# Patient Record
Sex: MALE | Race: WHITE | NOT HISPANIC OR LATINO | Employment: OTHER | URBAN - NONMETROPOLITAN AREA
[De-identification: names, ages, dates, MRNs, and addresses within clinical notes are randomized per-mention and may not be internally consistent; named-entity substitution may affect disease eponyms.]

---

## 2017-01-03 ENCOUNTER — OFFICE VISIT (OUTPATIENT)
Dept: OPHTHALMOLOGY | Facility: CLINIC | Age: 72
End: 2017-01-03

## 2017-01-03 ENCOUNTER — DOCUMENTATION (OUTPATIENT)
Dept: OPHTHALMOLOGY | Facility: CLINIC | Age: 72
End: 2017-01-03

## 2017-01-03 DIAGNOSIS — H34.11 CENTRAL RETINAL ARTERY OCCLUSION, RIGHT: Primary | ICD-10-CM

## 2017-01-03 PROCEDURE — 92004 COMPRE OPH EXAM NEW PT 1/>: CPT | Performed by: OPHTHALMOLOGY

## 2017-01-03 NOTE — PROGRESS NOTES
Subjective   Clifford Abdul is a 71 y.o. male.   Chief Complaint   Patient presents with   • Loss of Vision     HPI     BV OD 5 days ago, lasted sev hours, [place] [facility] tranferred to [place], w/u including CT head, carotid doppler, cardiac echo, blood tests, neg; 2 days ago, vision decr, no change, no pain; no GCA/PMR sxs       Last edited by John Snyder MD on 1/3/2017 11:01 AM.       Review of Systems    Objective   Visual Acuity (Snellen - Linear)      Right Left   Dist sc LP 20/40 +2   Near sc  J16                  Pupils      Light React APD   Right 3 1 +3   Left 3 3            Confrontational Visual Fields     Visual Fields      Left Right   Result Full    Restrictions  Total inferior temporal, superior nasal, inferior nasal deficiencies                  Extraocular Movement      Right Left   Result Full Full              Tonometry (Applanation, 8:36 AM)      Right Left   Pressure 18 22              Main Ophthalmology Exam     External Exam      Right Left    External Normal Normal      Slit Lamp Exam      Right Left    Lids/Lashes Normal Normal    Conjunctiva/Sclera White and quiet White and quiet    Cornea Pterygium 2.5mm nasal Clear    Anterior Chamber Deep and quiet Deep and quiet    Iris Round and reactive Round and reactive    Lens Clear Clear    Vitreous Normal Normal      Fundus Exam      Right Left    Disc Normal Normal    Macula diffuse retina edema, pallor, cherry red spot fovea, NFL infarct along inf temp arcade Normal    Vessels no embolus, Arteriolar narrowing Normal    Periphery Normal Normal                Assessment/Plan   Clifford was seen today for loss of vision.    Diagnoses and all orders for this visit:    Central retinal artery occlusion, right  Comments:  prob embolic, doubt GCA  Orders:  -     C-reactive Protein; Future  -     Sedimentation Rate; Future    discussed above with pt  ESR/CRP  Will call with results, f/u following

## 2017-01-06 ENCOUNTER — OFFICE VISIT (OUTPATIENT)
Dept: FAMILY MEDICINE CLINIC | Facility: CLINIC | Age: 72
End: 2017-01-06

## 2017-01-06 VITALS
BODY MASS INDEX: 23.25 KG/M2 | HEART RATE: 46 BPM | WEIGHT: 157 LBS | SYSTOLIC BLOOD PRESSURE: 180 MMHG | DIASTOLIC BLOOD PRESSURE: 99 MMHG | OXYGEN SATURATION: 98 % | HEIGHT: 69 IN

## 2017-01-06 DIAGNOSIS — N18.30 CHRONIC KIDNEY DISEASE, STAGE III (MODERATE) (HCC): Primary | ICD-10-CM

## 2017-01-06 DIAGNOSIS — I10 ESSENTIAL HYPERTENSION: ICD-10-CM

## 2017-01-06 DIAGNOSIS — H34.11 CENTRAL RETINAL ARTERY OCCLUSION OF RIGHT EYE: ICD-10-CM

## 2017-01-06 PROCEDURE — 99214 OFFICE O/P EST MOD 30 MIN: CPT | Performed by: FAMILY MEDICINE

## 2017-01-06 RX ORDER — ATORVASTATIN CALCIUM 40 MG/1
40 TABLET, FILM COATED ORAL NIGHTLY
Refills: 11 | COMMUNITY
Start: 2016-12-31 | End: 2017-12-26 | Stop reason: SDUPTHER

## 2017-01-06 RX ORDER — AMLODIPINE BESYLATE 5 MG/1
5 TABLET ORAL DAILY
Refills: 11 | COMMUNITY
Start: 2016-12-31 | End: 2017-01-06

## 2017-01-06 RX ORDER — AMLODIPINE BESYLATE 10 MG/1
10 TABLET ORAL DAILY
Qty: 30 TABLET | Refills: 11 | Status: SHIPPED | OUTPATIENT
Start: 2017-01-06 | End: 2017-01-13

## 2017-01-06 RX ORDER — ASPIRIN 81 MG/1
81 TABLET ORAL DAILY
COMMUNITY

## 2017-01-06 NOTE — PROGRESS NOTES
Subjective   Clifford Abdul is a 71 y.o. male.     Hypertension   This is a chronic problem. The current episode started more than 1 year ago. The problem has been waxing and waning since onset. Pertinent negatives include no headaches, orthopnea, palpitations, peripheral edema, PND or shortness of breath.   Loss of Vision   This is a new ([facility]) problem. The current episode started 1 to 4 weeks ago. The problem occurs rarely. The problem has been waxing and waning. Associated symptoms include vomiting. Pertinent negatives include no headaches or nausea.        The following portions of the patient's history were reviewed and updated as appropriate: allergies, current medications, past family history, past medical history, past social history, past surgical history and problem list.    Review of Systems   Eyes: Positive for visual disturbance.   Respiratory: Negative for shortness of breath.    Cardiovascular: Negative for palpitations, orthopnea and PND.   Gastrointestinal: Positive for vomiting. Negative for nausea.   Neurological: Negative for headaches.       Objective   Physical Exam   Constitutional: He is oriented to person, place, and time. He appears well-developed and well-nourished. No distress.   HENT:   Head: Normocephalic and atraumatic.   Eyes: Lids are normal. Pupils are equal, round, and reactive to light. Right eye exhibits no chemosis and no discharge. Left eye exhibits no chemosis and no discharge. Right eye exhibits normal extraocular motion and no nystagmus. Left eye exhibits normal extraocular motion and no nystagmus.   Neck: Normal carotid pulses present. Carotid bruit is not present.   Cardiovascular: Normal rate, regular rhythm and normal heart sounds.  Exam reveals no gallop and no friction rub.    No murmur heard.  Pulmonary/Chest: Effort normal and breath sounds normal. No respiratory distress. He has no wheezes. He has no rales. He exhibits no tenderness.    Neurological: He is alert and oriented to person, place, and time. No cranial nerve deficit or sensory deficit.   Skin: Skin is warm and dry. He is not diaphoretic.   Psychiatric: He has a normal mood and affect. His behavior is normal. Judgment and thought content normal.   Nursing note and vitals reviewed.      Assessment/Plan   Problems Addressed this Visit        Cardiovascular and Mediastinum    Essential hypertension    Relevant Medications    amLODIPine (NORVASC) 5 MG tablet    Central retinal artery occlusion of right eye       Genitourinary    Chronic kidney disease, stage III (moderate) - Primary        Check bp bid and return with results   loss of vision right eye 2 min. recurred and vision returned. Seen Dr Wilmer castillo Dx TIA. Ct and MRI neg in [facility]w returning. Notes for [facility] reviewed. ldl 118 and lipitor 40 started

## 2017-01-06 NOTE — MR AVS SNAPSHOT
Clifford Anrdt Franko   1/6/2017 1:15 PM   Office Visit    Dept Phone:  [phone]   Encounter #:  [id number]    Provider:  Shelton Oden MD   Department:  [facility] FAMILY MEDICINE                Your Full Care Plan              Today's Medication Changes          These changes are accurate as of: 1/6/17  2:11 PM.  If you have any questions, ask your nurse or doctor.               Medication(s)that have changed:     amLODIPine 10 MG tablet   Commonly known as:  NORVASC   Take 1 tablet by mouth Daily.   What changed:    - medication strength  - how much to take   Changed by:  Shelton Oden MD            Where to Get Your Medications      These medications were sent to [facility]/pharmacy #[id number] - [place], [place] - [address] [place] [address] - [phone]  - [phone] [address] [address]     Phone:  [phone]     amLODIPine 10 MG tablet                  Your Updated Medication List          This list is accurate as of: 1/6/17  2:11 PM.  Always use your most recent med list.                amLODIPine 10 MG tablet   Commonly known as:  NORVASC   Take 1 tablet by mouth Daily.       aspirin 81 MG EC tablet       atorvastatin 40 MG tablet   Commonly known as:  LIPITOR       lisinopril 40 MG tablet   Commonly known as:  PRINIVIL,ZESTRIL   Take 1 tablet by mouth Daily.       metoprolol succinate XL 50 MG 24 hr tablet   Commonly known as:  TOPROL-XL   Take 1 tablet by mouth Daily.       PARoxetine 20 MG tablet   Commonly known as:  PAXIL   Take 0.5 tablets by mouth Daily.       traZODone 100 MG tablet   Commonly known as:  DESYREL   Take 0.5-1 tablets by mouth Every Night.               You Were Diagnosed With        Codes Comments    Chronic kidney disease, stage III (moderate)    -  Primary ICD-10-CM: N18.3  ICD-9-CM: 585.3     Essential hypertension     ICD-10-CM: I10  ICD-9-CM: 401.9     Central retinal artery occlusion of right eye     ICD-10-CM:  H34.11  ICD-9-CM: 362.31       Instructions     None    Patient Instructions History      Upcoming Appointments     Visit Type Date Time Department    OFFICE VISIT 2017  1:15 PM [facility] FAM MED MAD 4TH    OFFICE VISIT 2017  2:45 PM [facility] MED MAD 4TH    OFFICE VISIT 2017  8:50 AM [facility] OPHTHALMOLOGY MAD    OFFICE VISIT 2017  8:00 AM [facility] FAM MED [facility] 4TH      MyChart Signup     [facility] allows you to send messages to your doctor, view your test results, renew your prescriptions, schedule appointments, and more. To sign up, go to [email or web address] and click on the Sign Up Now link in the New User? box. Enter your "[employer] Activation Code exactly as it appears below along with the last four digits of your Social Security Number and your Date of Birth () to complete the sign-up process. If you do not sign up before the expiration date, you must request a new code.    "[employer] Activation Code: [email or web address]  Expires: 2017  2:11 PM    If you have questions, you can email [email or web address] or call [phone] to talk to our "[employer] staff. Remember, "[employer] is NOT to be used for urgent needs. For medical emergencies, dial 911.               Other Info from Your Visit           Your Appointments     2017  2:45 PM CST   Office Visit with Shelton Oden MD   [facility] FAMILY MEDICINE (--)    [address]  [address] [facility] [address] [phone]           Arrive 15 minutes prior to appointment.            2017  8:50 AM CST   Office Visit with John Snyder MD   [facility] OPHTHALMOLOGY (--)    [address] [place] [address] Dr  [address] [facility] [address] [phone]           Arrive 15 minutes prior to appointment.            2017  8:00 AM CDT   Office Visit with Shelton dOen MD   [facility] FAMILY MEDICINE (--)    [address]  [address]  "[address]  [facility] [address] [phone]           Arrive 15 minutes prior to appointment.              Allergies     No Known Allergies      Reason for Visit     Hypertension     Follow-up hospital recheck      Vital Signs     Blood Pressure Pulse Height Weight Oxygen Saturation Body Mass Index    180/99 46 69\" (175.3 cm) 157 lb (71.2 kg) 98% 23.18 kg/m2    Smoking Status                   Never Smoker           Problems and Diagnoses Noted     Central retinal artery occlusion of right eye    Chronic kidney disease, stage III (moderate)    High blood pressure      No Longer an Issue     High blood pressure        "

## 2017-01-13 ENCOUNTER — OFFICE VISIT (OUTPATIENT)
Dept: FAMILY MEDICINE CLINIC | Facility: CLINIC | Age: 72
End: 2017-01-13

## 2017-01-13 VITALS
BODY MASS INDEX: 23.11 KG/M2 | WEIGHT: 156 LBS | SYSTOLIC BLOOD PRESSURE: 150 MMHG | DIASTOLIC BLOOD PRESSURE: 88 MMHG | HEIGHT: 69 IN

## 2017-01-13 DIAGNOSIS — I10 ESSENTIAL HYPERTENSION: Primary | ICD-10-CM

## 2017-01-13 PROCEDURE — 99213 OFFICE O/P EST LOW 20 MIN: CPT | Performed by: NURSE PRACTITIONER

## 2017-01-13 RX ORDER — VALSARTAN 160 MG/1
160 TABLET ORAL DAILY
Qty: 30 TABLET | Refills: 11 | Status: SHIPPED | OUTPATIENT
Start: 2017-01-13 | End: 2017-01-30

## 2017-01-13 NOTE — PROGRESS NOTES
"  Chief Complaint   Patient presents with   • Follow-up     1 week jordi on B/P ( Cecille patient )     Subjective   Clifford Abdul is a 71 y.o. male.     History of Present Illness     The following portions of the patient's history were reviewed and updated as appropriate: allergies, current medications, past social history and problem list.    Review of Systems    Objective   Visit Vitals   • /88 (BP Location: Left arm, Patient Position: Sitting, Cuff Size: Adult)   • Ht 69\" (175.3 cm)   • Wt 156 lb (70.8 kg)   • BMI 23.04 kg/m2     Physical Exam    Assessment/Plan   Problem List Items Addressed This Visit     None           New Medications Ordered This Visit   Medications   • valsartan (DIOVAN) 160 MG tablet     Sig: Take 1 tablet by mouth Daily.     Dispense:  30 tablet     Refill:  11          "

## 2017-01-13 NOTE — PROGRESS NOTES
Chief Complaint   Patient presents with   • Follow-up     1 week jordi on B/P ( [facility] patient )     Subjective   Clifford Abdul is a 71 y.o. male.     Hypertension   This is a chronic problem. The current episode started more than 1 month ago. The problem has been gradually worsening since onset. The problem is uncontrolled. Associated symptoms include anxiety and malaise/fatigue. Pertinent negatives include no blurred vision, chest pain, headaches, neck pain, palpitations, peripheral edema or shortness of breath. There are no associated agents to hypertension. Risk factors for coronary artery disease include male gender. Past treatments include ACE inhibitors, angiotensin blockers and calcium channel blockers. The current treatment provides mild improvement. There are no compliance problems.  Hypertensive end-organ damage includes kidney disease. There is no history of angina or PVD.        The following portions of the patient's history were reviewed and updated as appropriate: allergies, current medications, past social history and problem list.    Review of Systems   Constitutional: Positive for malaise/fatigue. Negative for activity change, appetite change, fatigue, fever and unexpected weight change.   HENT: Negative.  Negative for ear pain, postnasal drip, rhinorrhea and sinus pressure.    Eyes: Positive for visual disturbance. Negative for blurred vision and photophobia.        Right eye.  Pt sees Dr. Guillen.   Respiratory: Negative.  Negative for cough, shortness of breath and wheezing.    Cardiovascular: Negative.  Negative for chest pain, palpitations and leg swelling.   Gastrointestinal: Negative.    Endocrine: Negative.    Genitourinary: Negative.  Negative for difficulty urinating and dysuria.   Musculoskeletal: Negative.  Negative for arthralgias and neck pain.   Skin: Negative.    Allergic/Immunologic: Negative.  Negative for environmental allergies and food allergies.   Neurological: Negative.   "Negative for dizziness and headaches.   Hematological: Negative.    Psychiatric/Behavioral: The patient is nervous/anxious.         Pt states his HTN makes him anxious when he thinks about it or checks it too often.       Objective   Visit Vitals   • /88 (BP Location: Left arm, Patient Position: Sitting, Cuff Size: Adult)   • Ht 69\" (175.3 cm)   • Wt 156 lb (70.8 kg)   • BMI 23.04 kg/m2     Physical Exam   Constitutional: He is oriented to person, place, and time. He appears well-developed and well-nourished.   HENT:   Head: Normocephalic.   Cardiovascular: Normal rate, regular rhythm, normal heart sounds and intact distal pulses.  Exam reveals no gallop.    No murmur heard.  Pulmonary/Chest: Effort normal and breath sounds normal. He has no wheezes.   Abdominal: Normal appearance.   Neurological: He is alert and oriented to person, place, and time. GCS eye subscore is 4. GCS verbal subscore is 5. GCS motor subscore is 6.   Skin: Skin is warm and dry.   Psychiatric: He has a normal mood and affect. His speech is normal and behavior is normal.       Assessment/Plan   Problem List Items Addressed This Visit        Cardiovascular and Mediastinum    Essential hypertension - Primary    Relevant Medications    valsartan (DIOVAN) 160 MG tablet           New Medications Ordered This Visit   Medications   • valsartan (DIOVAN) 160 MG tablet     Sig: Take 1 tablet by mouth Daily.     Dispense:  30 tablet     Refill:  11       It's not just what you eat, but when you eat  Eat breakfast, and eat smaller meals throughout the day. A healthy breakfast can jumpstart your metabolism, while eating small, healthy meals (rather than the standard three large meals) keeps your energy up.   Avoid eating at night. Try to eat dinner earlier and fast for 14-16 hours until breakfast the next morning. Studies suggest that eating only when you’re most active and giving your digestive system a long break each day may help to regulate " weight.       Check bp once a day only -or if he feels bad-meds as directed

## 2017-01-13 NOTE — MR AVS SNAPSHOT
Clifford Abdul   1/13/2017 11:15 AM   Office Visit    Dept Phone:  [phone]   Encounter #:  [id number]    Provider:  PATRICIA Mendez   Department:  [facility] FAMILY MEDICINE                Your Full Care Plan              Today's Medication Changes          These changes are accurate as of: 1/13/17 11:42 AM.  If you have any questions, ask your nurse or doctor.               New Medication(s)Ordered:     valsartan 160 MG tablet   Commonly known as:  DIOVAN   Take 1 tablet by mouth Daily.   Started by:  PATRICIA Mendez         Stop taking medication(s)listed here:     amLODIPine 10 MG tablet   Commonly known as:  NORVASC   Stopped by:  PATRICIA Mendez                Where to Get Your Medications      These medications were sent to [facility]/pharmacy #[id number] - [place], [place] - [address] [place] [address] - [phone]  - [phone] [address] [address]     Phone:  [phone]     valsartan 160 MG tablet                  Your Updated Medication List          This list is accurate as of: 1/13/17 11:42 AM.  Always use your most recent med list.                aspirin 81 MG EC tablet       atorvastatin 40 MG tablet   Commonly known as:  LIPITOR       lisinopril 40 MG tablet   Commonly known as:  PRINIVIL,ZESTRIL   Take 1 tablet by mouth Daily.       metoprolol succinate XL 50 MG 24 hr tablet   Commonly known as:  TOPROL-XL   Take 1 tablet by mouth Daily.       PARoxetine 20 MG tablet   Commonly known as:  PAXIL   Take 0.5 tablets by mouth Daily.       traZODone 100 MG tablet   Commonly known as:  DESYREL   Take 0.5-1 tablets by mouth Every Night.       valsartan 160 MG tablet   Commonly known as:  DIOVAN   Take 1 tablet by mouth Daily.               Instructions     None    Patient Instructions History      Upcoming Appointments     Visit Type Date Time Department    OFFICE VISIT 1/13/2017 11:15 AM [facility] MED MAD 4TH    OFFICE VISIT 1/17/2017  8:50 AM  [facility] OPHTHALMOLOGY MAD    OFFICE VISIT 2017  8:30 AM [facility] MED MAD 4TH    OFFICE VISIT 2017  8:00 AM [facility] MED MAD 4TH      MyChart Signup     [facility] [employer] allows you to send messages to your doctor, view your test results, renew your prescriptions, schedule appointments, and more. To sign up, go to [employer] and click on the Sign Up Now link in the New User? box. Enter your [employer] Activation Code exactly as it appears below along with the last four digits of your Social Security Number and your Date of Birth () to complete the sign-up process. If you do not sign up before the expiration date, you must request a new code.    [employer] Activation Code: [email or web address]  Expires: 2017  2:11 PM    If you have questions, you can email [email or web address] or call [phone] to talk to our [employer] staff. Remember, [employer] is NOT to be used for urgent needs. For medical emergencies, dial 911.               Other Info from Your Visit           Your Appointments     2017  8:50 AM CST   Office Visit with John Snyder MD   [facility] OPHTHALMOLOGY (--)    [address] [place] [address] Dr  [address] [place] [address] [phone]           Arrive 15 minutes prior to appointment.            2017  8:30 AM CST   Office Visit with PATRICIA Mendez   [facility] FAMILY MEDICINE (--)    [address]  [address] [place] [address] [phone]           Arrive 15 minutes prior to appointment.            2017  8:00 AM CDT   Office Visit with Shelton Oden MD   [facility] FAMILY MEDICINE (--)    [address]  [address] [place] [address] [phone]           Arrive 15 minutes prior to appointment.              Allergies     No Known Allergies      Reason for Visit     Follow-up 1 week jordi on B/P ( [facility] patient )      Vital Signs   "   Blood Pressure Height Weight Body Mass Index Smoking Status       150/88 (BP Location: Left arm, Patient Position: Sitting, Cuff Size: Adult) 69\" (175.3 cm) 156 lb (70.8 kg) 23.04 kg/m2 Never Smoker         "

## 2017-01-17 ENCOUNTER — OFFICE VISIT (OUTPATIENT)
Dept: OPHTHALMOLOGY | Facility: CLINIC | Age: 72
End: 2017-01-17

## 2017-01-17 DIAGNOSIS — H34.11 CENTRAL RETINAL ARTERY OCCLUSION OF RIGHT EYE: Primary | ICD-10-CM

## 2017-01-17 PROCEDURE — 99212 OFFICE O/P EST SF 10 MIN: CPT | Performed by: OPHTHALMOLOGY

## 2017-01-17 NOTE — PROGRESS NOTES
Subjective   Clifford Abdul is a 71 y.o. male.   Chief Complaint   Patient presents with   • CRAO right eye     HPI     Vision better, ESR/CRP normal       Last edited by John Snyder MD on 1/17/2017  9:01 AM.       Review of Systems    Objective   Visual Acuity (Snellen - Linear)      Right Left   Dist sc CF at 3' 20/40 -1                  Pupils      Light React APD   Right 3 Sluggish +3   Left 3 3             Not recorded               Main Ophthalmology Exam     External Exam      Right Left    External Normal Normal      Slit Lamp Exam      Right Left    Lids/Lashes Normal Normal    Conjunctiva/Sclera White and quiet White and quiet    Cornea Pterygium 2.5mm nasal Clear    Anterior Chamber Deep and quiet Deep and quiet    Iris Round and reactive Round and reactive    Lens Clear Clear    Vitreous Normal Normal      Fundus Exam      Right Left    Disc Normal     Macula Normal     Vessels no embolus, Arteriolar narrowing                 Assessment/Plan   Clifford was seen today for crao right eye.    Diagnoses and all orders for this visit:    Central retinal artery occlusion of right eye        F/u one month  Refraction at that time

## 2017-01-17 NOTE — MR AVS SNAPSHOT
Clifford Abdul   2017 8:50 AM   Office Visit    Dept Phone:  [phone]   Encounter #:  [id number]    Provider:  John Snyder MD   Department:  [facility] OPHTHALMOLOGY                Your Full Care Plan              Your Updated Medication List          This list is accurate as of: 17  9:02 AM.  Always use your most recent med list.                aspirin 81 MG EC tablet       atorvastatin 40 MG tablet   Commonly known as:  LIPITOR       lisinopril 40 MG tablet   Commonly known as:  PRINIVIL,ZESTRIL   Take 1 tablet by mouth Daily.       metoprolol succinate XL 50 MG 24 hr tablet   Commonly known as:  TOPROL-XL   Take 1 tablet by mouth Daily.       PARoxetine 20 MG tablet   Commonly known as:  PAXIL   Take 0.5 tablets by mouth Daily.       traZODone 100 MG tablet   Commonly known as:  DESYREL   Take 0.5-1 tablets by mouth Every Night.       valsartan 160 MG tablet   Commonly known as:  DIOVAN   Take 1 tablet by mouth Daily.               You Were Diagnosed With        Codes Comments    Central retinal artery occlusion of right eye    -  Primary ICD-10-CM: H34.11  ICD-9-CM: 362.31       Instructions     None    Patient Instructions History      Upcoming Appointments     Visit Type Date Time Department    OFFICE VISIT 2017  8:50 AM [facility] OPHTHALMOLOGY MAD    OFFICE VISIT 2017  8:30 AM [facility] MED MAD 4TH    OFFICE VISIT 2017  8:30 AM [facility] OPHTHALMOLOGY MAD    OFFICE VISIT 2017  8:00 AM [facility] MED MAD 4TH      [facility] Signup     [facility] [employer] allows you to send messages to your doctor, view your test results, renew your prescriptions, schedule appointments, and more. To sign up, go to [employer] and click on the Sign Up Now link in the New User? box. Enter your [employer] Activation Code exactly as it appears below along with the last four digits of your Social Security Number and your Date of Birth () to complete  the sign-up process. If you do not sign up before the expiration date, you must request a new code.    [employer] Activation Code: [email or web address]  Expires: 1/20/2017  2:11 PM    If you have questions, you can email [email or web address] or call [phone] to talk to our [employer] staff. Remember, MyChart is NOT to be used for urgent needs. For medical emergencies, dial 911.               Other Info from Your Visit           Your Appointments     Jan 30, 2017  8:30 AM CST   Office Visit with PATRICIA Mendez   [facility] FAMILY MEDICINE (--)    [address]  [address] [place] [address] [phone]           Arrive 15 minutes prior to appointment.            Feb 21, 2017  8:30 AM CST   Office Visit with John Snyder MD   [facility] OPHTHALMOLOGY (--)    [address] [place] [address] Dr  [address] [place] [address] [phone]           Arrive 15 minutes prior to appointment.            Apr 14, 2017  8:00 AM CDT   Office Visit with Shelton Oden MD   [facility] FAMILY MEDICINE (--)    [address]  [address] [place] [address] [phone]           Arrive 15 minutes prior to appointment.              Allergies     No Known Allergies      Reason for Visit     CRAO right eye           Vital Signs     Smoking Status                   Never Smoker           Problems and Diagnoses Noted     Central retinal artery occlusion of right eye

## 2017-01-30 ENCOUNTER — OFFICE VISIT (OUTPATIENT)
Dept: FAMILY MEDICINE CLINIC | Facility: CLINIC | Age: 72
End: 2017-01-30

## 2017-01-30 VITALS
DIASTOLIC BLOOD PRESSURE: 86 MMHG | BODY MASS INDEX: 23.11 KG/M2 | WEIGHT: 156 LBS | SYSTOLIC BLOOD PRESSURE: 152 MMHG | HEIGHT: 69 IN

## 2017-01-30 DIAGNOSIS — I10 ESSENTIAL HYPERTENSION: Primary | ICD-10-CM

## 2017-01-30 PROCEDURE — 99213 OFFICE O/P EST LOW 20 MIN: CPT | Performed by: NURSE PRACTITIONER

## 2017-01-30 RX ORDER — AMLODIPINE BESYLATE 5 MG/1
5 TABLET ORAL DAILY
Qty: 30 TABLET | Refills: 11 | Status: SHIPPED | OUTPATIENT
Start: 2017-01-30 | End: 2017-02-01 | Stop reason: SDUPTHER

## 2017-01-30 NOTE — PROGRESS NOTES
Chief Complaint   Patient presents with   • Hypertension     Subjective   Clifford Abdul is a 71 y.o. male.     Hypertension   This is a chronic problem. The current episode started more than 1 month ago. The problem has been gradually worsening since onset. The problem is uncontrolled. Associated symptoms include anxiety and malaise/fatigue. Pertinent negatives include no blurred vision, chest pain, headaches, neck pain, palpitations, peripheral edema or shortness of breath. There are no associated agents to hypertension. Risk factors for coronary artery disease include male gender. Past treatments include ACE inhibitors, angiotensin blockers and calcium channel blockers. The current treatment provides mild improvement. There are no compliance problems.  Hypertensive end-organ damage includes kidney disease. There is no history of angina or PVD.        The following portions of the patient's history were reviewed and updated as appropriate: allergies, current medications, past social history and problem list.    Review of Systems   Constitutional: Positive for malaise/fatigue. Negative for activity change, appetite change, fatigue, fever and unexpected weight change.   HENT: Negative.  Negative for ear pain, postnasal drip, rhinorrhea and sinus pressure.    Eyes: Positive for visual disturbance. Negative for blurred vision and photophobia.        Right eye.  Pt sees Dr. Guillen.   Respiratory: Negative.  Negative for cough, shortness of breath and wheezing.    Cardiovascular: Negative.  Negative for chest pain, palpitations and leg swelling.   Gastrointestinal: Negative.    Endocrine: Negative.    Genitourinary: Negative.  Negative for difficulty urinating and dysuria.   Musculoskeletal: Negative.  Negative for arthralgias and neck pain.   Skin: Negative.    Allergic/Immunologic: Negative.  Negative for environmental allergies and food allergies.   Neurological: Negative.  Negative for dizziness and headaches.  "  Hematological: Negative.    Psychiatric/Behavioral: The patient is nervous/anxious.         Pt states his HTN makes him anxious when he thinks about it or checks it too often.       Objective   Visit Vitals   • /86   • Ht 69\" (175.3 cm)   • Wt 156 lb (70.8 kg)   • BMI 23.04 kg/m2     Physical Exam   Constitutional: He is oriented to person, place, and time. He appears well-developed and well-nourished.   HENT:   Head: Normocephalic and atraumatic.   Eyes: EOM are normal. Pupils are equal, round, and reactive to light.   Neck: Neck supple.   Cardiovascular: Normal rate, regular rhythm, normal heart sounds and intact distal pulses.  Exam reveals no gallop.    No murmur heard.  Pulmonary/Chest: Effort normal and breath sounds normal. He has no wheezes.   Abdominal: Soft. Normal appearance and bowel sounds are normal.   Neurological: He is alert and oriented to person, place, and time. GCS eye subscore is 4. GCS verbal subscore is 5. GCS motor subscore is 6.   Skin: Skin is warm and dry.   Psychiatric: He has a normal mood and affect. His speech is normal and behavior is normal.   Nursing note and vitals reviewed.      Assessment/Plan   Problem List Items Addressed This Visit        Cardiovascular and Mediastinum    Essential hypertension - Primary         No orders of the defined types were placed in this encounter.     continue meds as directed recheck if worsen   "

## 2017-02-01 RX ORDER — AMLODIPINE BESYLATE 5 MG/1
5 TABLET ORAL DAILY
Qty: 30 TABLET | Refills: 11 | Status: SHIPPED | OUTPATIENT
Start: 2017-02-01 | End: 2017-03-06 | Stop reason: SDUPTHER

## 2017-02-01 NOTE — TELEPHONE ENCOUNTER
Spoke with patient and instructed him to stop the diovan and start the norvasc 5 mg daily . Rx sent to [facility] for the norvasc 5 mg .

## 2017-02-21 ENCOUNTER — OFFICE VISIT (OUTPATIENT)
Dept: OPHTHALMOLOGY | Facility: CLINIC | Age: 72
End: 2017-02-21

## 2017-02-21 DIAGNOSIS — H52.03 HYPERMETROPIA, BILATERAL: ICD-10-CM

## 2017-02-21 DIAGNOSIS — H34.11 CENTRAL RETINAL ARTERY OCCLUSION OF RIGHT EYE: Primary | ICD-10-CM

## 2017-02-21 PROBLEM — H52.00 HYPERMETROPIA: Status: ACTIVE | Noted: 2017-02-21

## 2017-02-21 PROCEDURE — 99213 OFFICE O/P EST LOW 20 MIN: CPT | Performed by: OPHTHALMOLOGY

## 2017-02-21 NOTE — PROGRESS NOTES
Subjective   Clifford Abdul is a 71 y.o. male.   Chief Complaint   Patient presents with   • Central retinal artery occlusion right eye         Review of Systems    Objective   Visual Acuity (Snellen - Linear)      Right Left   Dist sc CF at 3' 20/40 -1            Manifest Refraction      Sphere Cylinder Dist Add   Right +1.00 Sphere 20/400 +2.50   Left +1.00 Sphere 20/20 +2.50            Final Rx      Sphere Cylinder Add   Right +1.00 Sphere +2.50   Left +1.00 Sphere +2.50            Pupils      Light React APD   Right 3 1 +3   Left 3 3             Not recorded               Main Ophthalmology Exam     External Exam      Right Left    External Normal Normal      Slit Lamp Exam      Right Left    Lids/Lashes Normal Normal    Conjunctiva/Sclera White and quiet White and quiet    Cornea Pterygium 2.5mm nasal Clear    Anterior Chamber Deep and quiet Deep and quiet    Iris Round and reactive Round and reactive    Lens Clear Clear    Vitreous Normal Normal      Fundus Exam      Right Left    Disc 2+ Pallor Normal    Macula Normal Normal    Vessels no embolus, Arteriolar narrowing Normal                Assessment/Plan   Diagnoses and all orders for this visit:    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

## 2017-03-06 RX ORDER — AMLODIPINE BESYLATE 5 MG/1
5 TABLET ORAL DAILY
Qty: 90 TABLET | Refills: 4 | Status: SHIPPED | OUTPATIENT
Start: 2017-03-06 | End: 2017-08-02 | Stop reason: SDUPTHER

## 2017-04-14 ENCOUNTER — LAB (OUTPATIENT)
Dept: LAB | Facility: HOSPITAL | Age: 72
End: 2017-04-14

## 2017-04-14 ENCOUNTER — TELEPHONE (OUTPATIENT)
Dept: FAMILY MEDICINE CLINIC | Facility: CLINIC | Age: 72
End: 2017-04-14

## 2017-04-14 ENCOUNTER — OFFICE VISIT (OUTPATIENT)
Dept: FAMILY MEDICINE CLINIC | Facility: CLINIC | Age: 72
End: 2017-04-14

## 2017-04-14 VITALS
BODY MASS INDEX: 22.89 KG/M2 | SYSTOLIC BLOOD PRESSURE: 134 MMHG | HEART RATE: 48 BPM | DIASTOLIC BLOOD PRESSURE: 88 MMHG | OXYGEN SATURATION: 98 % | WEIGHT: 155 LBS

## 2017-04-14 DIAGNOSIS — R45.4 IRRITABILITY: ICD-10-CM

## 2017-04-14 DIAGNOSIS — Z12.5 ENCOUNTER FOR PROSTATE CANCER SCREENING: ICD-10-CM

## 2017-04-14 DIAGNOSIS — I10 ESSENTIAL HYPERTENSION: Primary | ICD-10-CM

## 2017-04-14 DIAGNOSIS — E78.00 PURE HYPERCHOLESTEROLEMIA: ICD-10-CM

## 2017-04-14 DIAGNOSIS — N18.30 CHRONIC KIDNEY DISEASE, STAGE III (MODERATE) (HCC): ICD-10-CM

## 2017-04-14 LAB
ALBUMIN SERPL-MCNC: 4.3 G/DL (ref 3.4–4.8)
ALBUMIN/GLOB SERPL: 1.5 G/DL (ref 1.1–1.8)
ALP SERPL-CCNC: 119 U/L (ref 38–126)
ALT SERPL W P-5'-P-CCNC: 55 U/L (ref 21–72)
ANION GAP SERPL CALCULATED.3IONS-SCNC: 12 MMOL/L (ref 5–15)
ARTICHOKE IGE QN: 35 MG/DL (ref 1–129)
AST SERPL-CCNC: 55 U/L (ref 17–59)
BILIRUB SERPL-MCNC: 0.9 MG/DL (ref 0.2–1.3)
BUN BLD-MCNC: 16 MG/DL (ref 7–21)
BUN/CREAT SERPL: 11.7 (ref 7–25)
CALCIUM SPEC-SCNC: 9.7 MG/DL (ref 8.4–10.2)
CHLORIDE SERPL-SCNC: 98 MMOL/L (ref 95–110)
CHOLEST SERPL-MCNC: 122 MG/DL (ref 0–199)
CO2 SERPL-SCNC: 28 MMOL/L (ref 22–31)
CREAT BLD-MCNC: 1.37 MG/DL (ref 0.7–1.3)
GFR SERPL CREATININE-BSD FRML MDRD: 52 ML/MIN/1.73 (ref 49–113)
GLOBULIN UR ELPH-MCNC: 2.8 GM/DL (ref 2.3–3.5)
GLUCOSE BLD-MCNC: 98 MG/DL (ref 60–100)
HDLC SERPL-MCNC: 61 MG/DL (ref 60–200)
LDLC/HDLC SERPL: 0.83 {RATIO} (ref 0–3.55)
POTASSIUM BLD-SCNC: 4.6 MMOL/L (ref 3.5–5.1)
PROT SERPL-MCNC: 7.1 G/DL (ref 6.3–8.6)
PSA SERPL-MCNC: 2.13 NG/ML (ref 0–4)
SODIUM BLD-SCNC: 138 MMOL/L (ref 137–145)
TRIGL SERPL-MCNC: 53 MG/DL (ref 20–199)

## 2017-04-14 PROCEDURE — 80053 COMPREHEN METABOLIC PANEL: CPT | Performed by: FAMILY MEDICINE

## 2017-04-14 PROCEDURE — 99214 OFFICE O/P EST MOD 30 MIN: CPT | Performed by: FAMILY MEDICINE

## 2017-04-14 PROCEDURE — 80061 LIPID PANEL: CPT | Performed by: FAMILY MEDICINE

## 2017-04-14 PROCEDURE — 36415 COLL VENOUS BLD VENIPUNCTURE: CPT | Performed by: FAMILY MEDICINE

## 2017-04-14 PROCEDURE — G0103 PSA SCREENING: HCPCS

## 2017-04-14 RX ORDER — OMEGA-3 FATTY ACIDS/FISH OIL 300-1000MG
1000 CAPSULE ORAL DAILY
COMMUNITY
End: 2018-04-27

## 2017-04-14 NOTE — PROGRESS NOTES
Chief Complaint   Patient presents with   • Hypertension     Subjective   Clifford Abdul is a 71 y.o. male.     Hypertension   This is a chronic problem. The current episode started more than 1 month ago. The problem has been gradually worsening since onset. The problem is uncontrolled. Associated symptoms include anxiety. Pertinent negatives include no blurred vision, chest pain, headaches, malaise/fatigue, neck pain, orthopnea, peripheral edema, PND or shortness of breath. There are no associated agents to hypertension. Risk factors for coronary artery disease include male gender. Past treatments include ACE inhibitors, angiotensin blockers and calcium channel blockers. The current treatment provides mild improvement. There are no compliance problems.  Hypertensive end-organ damage includes kidney disease. There is no history of angina or PVD.   Chronic Kidney Disease   This is a chronic problem. The current episode started more than 1 year ago. The problem has been unchanged. Pertinent negatives include no arthralgias, chest pain, coughing, fatigue, fever, headaches, myalgias or neck pain.   Hyperlipidemia   This is a chronic problem. The problem is controlled. Pertinent negatives include no chest pain, myalgias or shortness of breath.   Depression   Visit Type: follow-up  Patient is not experiencing: depressed mood, insomnia, irritability, nervousness/anxiety and shortness of breath.         The following portions of the patient's history were reviewed and updated as appropriate: allergies, current medications, past social history and problem list.    Review of Systems   Constitutional: Negative for activity change, appetite change, fatigue, fever, irritability, malaise/fatigue and unexpected weight change.   HENT: Negative.  Negative for ear pain, postnasal drip, rhinorrhea and sinus pressure.    Eyes: Positive for visual disturbance. Negative for blurred vision and photophobia.        Right eye.  Pt sees  Dr. Guileln.   Respiratory: Negative.  Negative for cough, shortness of breath and wheezing.    Cardiovascular: Negative.  Negative for chest pain, orthopnea, leg swelling and PND.   Gastrointestinal: Negative.    Endocrine: Negative.    Genitourinary: Negative.  Negative for difficulty urinating and dysuria.   Musculoskeletal: Negative.  Negative for arthralgias, myalgias and neck pain.   Skin: Negative.    Allergic/Immunologic: Negative.  Negative for environmental allergies and food allergies.   Neurological: Negative.  Negative for dizziness and headaches.   Hematological: Negative.    Psychiatric/Behavioral: The patient is not nervous/anxious and does not have insomnia.        Objective   /88  Pulse (!) 48  Wt 155 lb (70.3 kg)  SpO2 98%  BMI 22.89 kg/m2  Physical Exam   Constitutional: He is oriented to person, place, and time. He appears well-developed and well-nourished.   HENT:   Head: Normocephalic and atraumatic.   Eyes: EOM are normal. Pupils are equal, round, and reactive to light.   Neck: Neck supple.   Cardiovascular: Normal rate, regular rhythm, normal heart sounds and intact distal pulses.  Exam reveals no gallop and no friction rub.    No murmur heard.  Pulmonary/Chest: Effort normal and breath sounds normal. He has no wheezes. He has no rales. He exhibits no tenderness.   Abdominal: Soft. Normal appearance and bowel sounds are normal.   Musculoskeletal: He exhibits no edema or tenderness.       Vascular Status -  His exam exhibits right foot vasculature normal. His exam exhibits no right foot edema. His exam exhibits left foot vasculature normal. His exam exhibits no left foot edema.  Neurological: He is alert and oriented to person, place, and time. GCS eye subscore is 4. GCS verbal subscore is 5. GCS motor subscore is 6.   Skin: Skin is warm and dry.   Psychiatric: He has a normal mood and affect. His speech is normal and behavior is normal. Thought content normal.   Nursing note and  vitals reviewed.      Assessment/Plan   Problem List Items Addressed This Visit        Cardiovascular and Mediastinum    Essential hypertension - Primary    Relevant Orders    Comprehensive Metabolic Panel (Completed)       Genitourinary    Chronic kidney disease, stage III (moderate)      Other Visit Diagnoses     Pure hypercholesterolemia        Relevant Orders    Lipid Panel (Completed)    Irritability        Encounter for prostate cancer screening        Relevant Orders    PSA Screen (Completed)           New Medications Ordered This Visit   Medications   • Omega 3 1000 MG capsule     Sig: Take 1,000 mg by mouth Daily.      continue meds as directed recheck if worsen

## 2017-04-14 NOTE — TELEPHONE ENCOUNTER
----- Message from Shelton Oden MD sent at 4/14/2017 12:49 PM CDT -----  Renal function stable.  Otherwise okay

## 2017-04-21 RX ORDER — LISINOPRIL 40 MG/1
TABLET ORAL
Qty: 90 TABLET | Refills: 3 | Status: SHIPPED | OUTPATIENT
Start: 2017-04-21 | End: 2018-04-27 | Stop reason: SDUPTHER

## 2017-04-21 RX ORDER — METOPROLOL SUCCINATE 50 MG/1
TABLET, EXTENDED RELEASE ORAL
Qty: 90 TABLET | Refills: 3 | Status: SHIPPED | OUTPATIENT
Start: 2017-04-21 | End: 2018-04-27 | Stop reason: SDUPTHER

## 2017-05-22 RX ORDER — TRAZODONE HYDROCHLORIDE 100 MG/1
TABLET ORAL
Qty: 90 TABLET | Refills: 1 | Status: SHIPPED | OUTPATIENT
Start: 2017-05-22 | End: 2018-04-27 | Stop reason: SDUPTHER

## 2017-05-23 ENCOUNTER — OFFICE VISIT (OUTPATIENT)
Dept: OPHTHALMOLOGY | Facility: CLINIC | Age: 72
End: 2017-05-23

## 2017-05-23 DIAGNOSIS — H34.11 CENTRAL RETINAL ARTERY OCCLUSION OF RIGHT EYE: Primary | ICD-10-CM

## 2017-05-23 DIAGNOSIS — H11.001 PTERYGIUM, RIGHT: ICD-10-CM

## 2017-05-23 PROCEDURE — 99213 OFFICE O/P EST LOW 20 MIN: CPT | Performed by: OPHTHALMOLOGY

## 2017-07-24 RX ORDER — PAROXETINE HYDROCHLORIDE 20 MG/1
TABLET, FILM COATED ORAL
Qty: 90 TABLET | Refills: 1 | Status: SHIPPED | OUTPATIENT
Start: 2017-07-24 | End: 2018-08-28 | Stop reason: SDUPTHER

## 2017-08-02 RX ORDER — AMLODIPINE BESYLATE 5 MG/1
5 TABLET ORAL DAILY
Qty: 90 TABLET | Refills: 4 | Status: SHIPPED | OUTPATIENT
Start: 2017-08-02 | End: 2017-12-26 | Stop reason: SDUPTHER

## 2017-10-13 ENCOUNTER — APPOINTMENT (OUTPATIENT)
Dept: LAB | Facility: HOSPITAL | Age: 72
End: 2017-10-13

## 2017-10-13 ENCOUNTER — OFFICE VISIT (OUTPATIENT)
Dept: FAMILY MEDICINE CLINIC | Facility: CLINIC | Age: 72
End: 2017-10-13

## 2017-10-13 ENCOUNTER — TELEPHONE (OUTPATIENT)
Dept: FAMILY MEDICINE CLINIC | Facility: CLINIC | Age: 72
End: 2017-10-13

## 2017-10-13 VITALS
OXYGEN SATURATION: 98 % | BODY MASS INDEX: 22.82 KG/M2 | HEIGHT: 69 IN | WEIGHT: 154.1 LBS | DIASTOLIC BLOOD PRESSURE: 74 MMHG | HEART RATE: 54 BPM | SYSTOLIC BLOOD PRESSURE: 126 MMHG

## 2017-10-13 DIAGNOSIS — I10 ESSENTIAL HYPERTENSION: Primary | ICD-10-CM

## 2017-10-13 DIAGNOSIS — E78.00 PURE HYPERCHOLESTEROLEMIA: ICD-10-CM

## 2017-10-13 DIAGNOSIS — Z23 FLU VACCINE NEED: ICD-10-CM

## 2017-10-13 DIAGNOSIS — N18.30 CHRONIC KIDNEY DISEASE, STAGE III (MODERATE) (HCC): ICD-10-CM

## 2017-10-13 LAB
ALBUMIN SERPL-MCNC: 4.3 G/DL (ref 3.4–4.8)
ALBUMIN/GLOB SERPL: 1.5 G/DL (ref 1.1–1.8)
ALP SERPL-CCNC: 112 U/L (ref 38–126)
ALT SERPL W P-5'-P-CCNC: 42 U/L (ref 21–72)
ANION GAP SERPL CALCULATED.3IONS-SCNC: 11 MMOL/L (ref 5–15)
ARTICHOKE IGE QN: 36 MG/DL (ref 1–129)
AST SERPL-CCNC: 44 U/L (ref 17–59)
BILIRUB SERPL-MCNC: 0.9 MG/DL (ref 0.2–1.3)
BUN BLD-MCNC: 18 MG/DL (ref 7–21)
BUN/CREAT SERPL: 13.2 (ref 7–25)
CALCIUM SPEC-SCNC: 9.9 MG/DL (ref 8.4–10.2)
CHLORIDE SERPL-SCNC: 100 MMOL/L (ref 95–110)
CHOLEST SERPL-MCNC: 124 MG/DL (ref 0–199)
CO2 SERPL-SCNC: 29 MMOL/L (ref 22–31)
CREAT BLD-MCNC: 1.36 MG/DL (ref 0.7–1.3)
GFR SERPL CREATININE-BSD FRML MDRD: 52 ML/MIN/1.73 (ref 42–98)
GLOBULIN UR ELPH-MCNC: 2.9 GM/DL (ref 2.3–3.5)
GLUCOSE BLD-MCNC: 103 MG/DL (ref 60–100)
HDLC SERPL-MCNC: 66 MG/DL (ref 60–200)
LDLC/HDLC SERPL: 0.7 {RATIO} (ref 0–3.55)
POTASSIUM BLD-SCNC: 4.5 MMOL/L (ref 3.5–5.1)
PROT SERPL-MCNC: 7.2 G/DL (ref 6.3–8.6)
SODIUM BLD-SCNC: 140 MMOL/L (ref 137–145)
TRIGL SERPL-MCNC: 59 MG/DL (ref 20–199)

## 2017-10-13 PROCEDURE — 80053 COMPREHEN METABOLIC PANEL: CPT | Performed by: FAMILY MEDICINE

## 2017-10-13 PROCEDURE — G0008 ADMIN INFLUENZA VIRUS VAC: HCPCS | Performed by: FAMILY MEDICINE

## 2017-10-13 PROCEDURE — 80061 LIPID PANEL: CPT | Performed by: FAMILY MEDICINE

## 2017-10-13 PROCEDURE — 90662 IIV NO PRSV INCREASED AG IM: CPT | Performed by: FAMILY MEDICINE

## 2017-10-13 PROCEDURE — 99214 OFFICE O/P EST MOD 30 MIN: CPT | Performed by: FAMILY MEDICINE

## 2017-10-13 PROCEDURE — 36415 COLL VENOUS BLD VENIPUNCTURE: CPT | Performed by: FAMILY MEDICINE

## 2017-10-13 NOTE — PROGRESS NOTES
Pr Dr. Oden, Mr. Abdul has been called with his recent lab results & recommendations.  Continue his current medications and follow-up as planned or sooner if any problems.

## 2017-10-13 NOTE — TELEPHONE ENCOUNTER
Pr Dr. Oden, Mr. Abdul has been called with his recent lab results & recommendations.  Continue his current medications and follow-up as planned or sooner if any problems.      ----- Message from Shelton Oden MD sent at 10/13/2017  3:25 PM CDT -----  Renal function stable

## 2017-10-13 NOTE — PROGRESS NOTES
Chief Complaint   Patient presents with   • Hypertension     Subjective   Clifford Abdul is a 72 y.o. male.     Hypertension   This is a chronic problem. The current episode started more than 1 month ago. The problem has been gradually worsening since onset. The problem is uncontrolled. Pertinent negatives include no anxiety, blurred vision, chest pain, malaise/fatigue, orthopnea, peripheral edema, PND or shortness of breath. There are no associated agents to hypertension. Risk factors for coronary artery disease include male gender. Past treatments include ACE inhibitors, angiotensin blockers and calcium channel blockers. The current treatment provides mild improvement. There are no compliance problems.  Hypertensive end-organ damage includes kidney disease. There is no history of angina or PVD.   Chronic Kidney Disease   This is a chronic problem. The current episode started more than 1 year ago. The problem has been unchanged. Pertinent negatives include no chest pain or myalgias.   Hyperlipidemia   This is a chronic problem. The problem is controlled. Pertinent negatives include no chest pain, myalgias or shortness of breath.   Depression   Visit Type: follow-up  Patient is not experiencing: depressed mood, insomnia, irritability, nervousness/anxiety and shortness of breath.         The following portions of the patient's history were reviewed and updated as appropriate: allergies, current medications, past social history and problem list.    Review of Systems   Constitutional: Negative for irritability, malaise/fatigue and unexpected weight change.   HENT: Positive for rhinorrhea.    Eyes: Negative for blurred vision, photophobia and visual disturbance.        Right eye.  Pt sees Dr. Guillen.   Respiratory: Negative.  Negative for shortness of breath and wheezing.    Cardiovascular: Negative.  Negative for chest pain, orthopnea, leg swelling and PND.   Gastrointestinal: Negative.    Endocrine: Negative.   "  Genitourinary: Negative.    Musculoskeletal: Negative.  Negative for myalgias.   Skin: Negative.    Allergic/Immunologic: Negative.    Neurological: Negative.    Hematological: Negative.    Psychiatric/Behavioral: The patient is not nervous/anxious and does not have insomnia.        Objective   /74  Pulse 54  Ht 69\" (175.3 cm)  Wt 154 lb 1.6 oz (69.9 kg)  SpO2 98%  BMI 22.76 kg/m2  Physical Exam   Constitutional: He is oriented to person, place, and time. He appears well-developed and well-nourished.   HENT:   Head: Normocephalic and atraumatic.   Eyes: EOM are normal. Pupils are equal, round, and reactive to light.   Neck: Neck supple.   Cardiovascular: Normal rate, regular rhythm, normal heart sounds and intact distal pulses.  Exam reveals no gallop and no friction rub.    No murmur heard.  Pulmonary/Chest: Effort normal and breath sounds normal. He has no wheezes. He has no rales. He exhibits no tenderness.   Abdominal: Soft. Normal appearance and bowel sounds are normal.   Musculoskeletal: He exhibits no edema or tenderness.       Vascular Status -  His exam exhibits right foot vasculature normal. His exam exhibits no right foot edema. His exam exhibits left foot vasculature normal. His exam exhibits no left foot edema.  Neurological: He is alert and oriented to person, place, and time. GCS eye subscore is 4. GCS verbal subscore is 5. GCS motor subscore is 6.   Skin: Skin is warm and dry.   Psychiatric: He has a normal mood and affect. His speech is normal and behavior is normal. Thought content normal.   Nursing note and vitals reviewed.      Assessment/Plan   Problem List Items Addressed This Visit        Cardiovascular and Mediastinum    Essential hypertension - Primary    Relevant Orders    Comprehensive Metabolic Panel    Pure hypercholesterolemia    Relevant Orders    Lipid Panel       Genitourinary    Chronic kidney disease, stage III (moderate)           No orders of the defined types were " placed in this encounter.     continue meds as directed recheck if worsen

## 2017-12-26 ENCOUNTER — TELEPHONE (OUTPATIENT)
Dept: FAMILY MEDICINE CLINIC | Facility: CLINIC | Age: 72
End: 2017-12-26

## 2017-12-26 RX ORDER — AMLODIPINE BESYLATE 5 MG/1
5 TABLET ORAL DAILY
Qty: 90 TABLET | Refills: 4 | Status: SHIPPED | OUTPATIENT
Start: 2017-12-26 | End: 2018-10-29

## 2017-12-26 RX ORDER — ATORVASTATIN CALCIUM 40 MG/1
40 TABLET, FILM COATED ORAL NIGHTLY
Qty: 30 TABLET | Refills: 11 | Status: SHIPPED | OUTPATIENT
Start: 2017-12-26 | End: 2019-01-11 | Stop reason: SDUPTHER

## 2018-04-27 ENCOUNTER — APPOINTMENT (OUTPATIENT)
Dept: LAB | Facility: HOSPITAL | Age: 73
End: 2018-04-27

## 2018-04-27 ENCOUNTER — OFFICE VISIT (OUTPATIENT)
Dept: FAMILY MEDICINE CLINIC | Facility: CLINIC | Age: 73
End: 2018-04-27

## 2018-04-27 VITALS
HEIGHT: 69 IN | WEIGHT: 154 LBS | HEART RATE: 45 BPM | DIASTOLIC BLOOD PRESSURE: 78 MMHG | SYSTOLIC BLOOD PRESSURE: 130 MMHG | OXYGEN SATURATION: 98 % | BODY MASS INDEX: 22.81 KG/M2

## 2018-04-27 DIAGNOSIS — I10 ESSENTIAL HYPERTENSION: ICD-10-CM

## 2018-04-27 DIAGNOSIS — E78.00 PURE HYPERCHOLESTEROLEMIA: Primary | ICD-10-CM

## 2018-04-27 DIAGNOSIS — F32.89 OTHER DEPRESSION: ICD-10-CM

## 2018-04-27 DIAGNOSIS — N18.30 CHRONIC KIDNEY DISEASE, STAGE III (MODERATE) (HCC): ICD-10-CM

## 2018-04-27 DIAGNOSIS — H34.11 CENTRAL RETINAL ARTERY OCCLUSION OF RIGHT EYE: ICD-10-CM

## 2018-04-27 PROBLEM — F32.A DEPRESSION: Status: ACTIVE | Noted: 2018-04-27

## 2018-04-27 LAB
ALBUMIN SERPL-MCNC: 4.2 G/DL (ref 3.4–4.8)
ALBUMIN/GLOB SERPL: 1.3 G/DL (ref 1.1–1.8)
ALP SERPL-CCNC: 134 U/L (ref 38–126)
ALT SERPL W P-5'-P-CCNC: 50 U/L (ref 21–72)
ANION GAP SERPL CALCULATED.3IONS-SCNC: 13 MMOL/L (ref 5–15)
ARTICHOKE IGE QN: 30 MG/DL (ref 1–129)
AST SERPL-CCNC: 56 U/L (ref 17–59)
BILIRUB SERPL-MCNC: 0.8 MG/DL (ref 0.2–1.3)
BUN BLD-MCNC: 13 MG/DL (ref 7–21)
BUN/CREAT SERPL: 10.2 (ref 7–25)
CALCIUM SPEC-SCNC: 9.6 MG/DL (ref 8.4–10.2)
CHLORIDE SERPL-SCNC: 101 MMOL/L (ref 95–110)
CHOLEST SERPL-MCNC: 116 MG/DL (ref 0–199)
CO2 SERPL-SCNC: 26 MMOL/L (ref 22–31)
CREAT BLD-MCNC: 1.27 MG/DL (ref 0.7–1.3)
GFR SERPL CREATININE-BSD FRML MDRD: 56 ML/MIN/1.73 (ref 42–98)
GLOBULIN UR ELPH-MCNC: 3.2 GM/DL (ref 2.3–3.5)
GLUCOSE BLD-MCNC: 95 MG/DL (ref 60–100)
HDLC SERPL-MCNC: 62 MG/DL (ref 60–200)
LDLC/HDLC SERPL: 0.65 {RATIO} (ref 0–3.55)
POTASSIUM BLD-SCNC: 4.4 MMOL/L (ref 3.5–5.1)
PROT SERPL-MCNC: 7.4 G/DL (ref 6.3–8.6)
SODIUM BLD-SCNC: 140 MMOL/L (ref 137–145)
TRIGL SERPL-MCNC: 67 MG/DL (ref 20–199)

## 2018-04-27 PROCEDURE — 80061 LIPID PANEL: CPT | Performed by: FAMILY MEDICINE

## 2018-04-27 PROCEDURE — 36415 COLL VENOUS BLD VENIPUNCTURE: CPT | Performed by: FAMILY MEDICINE

## 2018-04-27 PROCEDURE — 99214 OFFICE O/P EST MOD 30 MIN: CPT | Performed by: FAMILY MEDICINE

## 2018-04-27 PROCEDURE — 80053 COMPREHEN METABOLIC PANEL: CPT | Performed by: FAMILY MEDICINE

## 2018-04-27 RX ORDER — LISINOPRIL 40 MG/1
40 TABLET ORAL DAILY
Qty: 90 TABLET | Refills: 3 | Status: SHIPPED | OUTPATIENT
Start: 2018-04-27 | End: 2019-04-24 | Stop reason: SDUPTHER

## 2018-04-27 RX ORDER — TRAZODONE HYDROCHLORIDE 100 MG/1
TABLET ORAL
Qty: 90 TABLET | Refills: 1 | Status: SHIPPED | OUTPATIENT
Start: 2018-04-27 | End: 2019-10-30 | Stop reason: SDUPTHER

## 2018-04-27 RX ORDER — METOPROLOL SUCCINATE 50 MG/1
50 TABLET, EXTENDED RELEASE ORAL DAILY
Qty: 90 TABLET | Refills: 3 | Status: SHIPPED | OUTPATIENT
Start: 2018-04-27 | End: 2019-04-24 | Stop reason: SDUPTHER

## 2018-04-27 NOTE — PROGRESS NOTES
Chief Complaint   Patient presents with   • Hypertension     Subjective   Clifford Abdul is a 72 y.o. male.     Hypertension   This is a chronic problem. The current episode started more than 1 month ago. The problem has been gradually worsening since onset. The problem is uncontrolled. Pertinent negatives include no anxiety, blurred vision, chest pain, malaise/fatigue, orthopnea, peripheral edema, PND or shortness of breath. There are no associated agents to hypertension. Risk factors for coronary artery disease include male gender. Current antihypertension treatment includes ACE inhibitors, angiotensin blockers and calcium channel blockers. The current treatment provides mild improvement. There are no compliance problems.  Hypertensive end-organ damage includes kidney disease. There is no history of angina or PVD.   Chronic Kidney Disease   This is a chronic problem. The current episode started more than 1 year ago. The problem has been unchanged. Pertinent negatives include no chest pain or myalgias.   Hyperlipidemia   This is a chronic problem. The current episode started more than 1 year ago. The problem is controlled. Recent lipid tests were reviewed and are normal. Pertinent negatives include no chest pain, myalgias or shortness of breath.   Depression   Visit Type: follow-up  Patient is not experiencing: depressed mood, insomnia, irritability, nervousness/anxiety and shortness of breath.         The following portions of the patient's history were reviewed and updated as appropriate: allergies, current medications, past social history and problem list.    Review of Systems   Constitutional: Negative for irritability, malaise/fatigue and unexpected weight change.   Eyes: Negative for blurred vision.   Respiratory: Negative.  Negative for shortness of breath and wheezing.    Cardiovascular: Negative.  Negative for chest pain, orthopnea, leg swelling and PND.   Gastrointestinal: Negative.    Endocrine:  "Negative.    Genitourinary: Negative.    Musculoskeletal: Negative.  Negative for myalgias.   Skin: Negative.    Allergic/Immunologic: Negative.    Neurological: Negative.    Hematological: Negative.    Psychiatric/Behavioral: The patient is not nervous/anxious and does not have insomnia.        Objective   /78   Pulse (!) 45   Ht 175.3 cm (69\")   Wt 69.9 kg (154 lb)   SpO2 98%   BMI 22.74 kg/m²   Physical Exam   Constitutional: He is oriented to person, place, and time. He appears well-developed and well-nourished.   HENT:   Head: Normocephalic and atraumatic.   Eyes: EOM are normal. Pupils are equal, round, and reactive to light.   Neck: Neck supple.   Cardiovascular: Normal rate, regular rhythm, normal heart sounds and intact distal pulses.  Exam reveals no gallop and no friction rub.    No murmur heard.  Pulmonary/Chest: Effort normal and breath sounds normal. He has no wheezes. He has no rales. He exhibits no tenderness.   Abdominal: Soft. Normal appearance and bowel sounds are normal.   Musculoskeletal: He exhibits no edema or tenderness.     Vascular Status -  His right foot exhibits abnormal foot vasculature . His right foot exhibits no edema. His left foot exhibits abnormal foot vasculature . His left foot exhibits no edema.  Neurological: He is alert and oriented to person, place, and time. GCS eye subscore is 4. GCS verbal subscore is 5. GCS motor subscore is 6.   Skin: Skin is warm and dry.   Psychiatric: He has a normal mood and affect. His speech is normal and behavior is normal. Thought content normal.   Nursing note and vitals reviewed.      Assessment/Plan   Problem List Items Addressed This Visit        Cardiovascular and Mediastinum    Essential hypertension    Relevant Medications    lisinopril (PRINIVIL,ZESTRIL) 40 MG tablet    metoprolol succinate XL (TOPROL-XL) 50 MG 24 hr tablet    Other Relevant Orders    Comprehensive Metabolic Panel    Central retinal artery occlusion of right " eye    Relevant Orders    Ambulatory Referral for Diabetic Eye Exam-Ophthalmology    Pure hypercholesterolemia - Primary    Relevant Orders    Lipid Panel       Genitourinary    Chronic kidney disease, stage III (moderate)       Other    Depression    Relevant Medications    traZODone (DESYREL) 100 MG tablet      Other Visit Diagnoses    None.          New Medications Ordered This Visit   Medications   • traZODone (DESYREL) 100 MG tablet     Sig: TAKE ONE-HALF TO ONE TABLET BY MOUTH EVERY NIGHT AT BEDTIME FOR SLEEP     Dispense:  90 tablet     Refill:  1   • lisinopril (PRINIVIL,ZESTRIL) 40 MG tablet     Sig: Take 1 tablet by mouth Daily.     Dispense:  90 tablet     Refill:  3   • metoprolol succinate XL (TOPROL-XL) 50 MG 24 hr tablet     Sig: Take 1 tablet by mouth Daily.     Dispense:  90 tablet     Refill:  3      continue meds as directed reche

## 2018-04-30 ENCOUNTER — TELEPHONE (OUTPATIENT)
Dept: FAMILY MEDICINE CLINIC | Facility: CLINIC | Age: 73
End: 2018-04-30

## 2018-04-30 NOTE — TELEPHONE ENCOUNTER
-Pr Dr. Oden, Mr. Abdul has been called with his recent lab results & recommendations.  Continue his current medications and follow-up as planned or sooner if any problems.      ---- Message from Shelton Oden MD sent at 4/30/2018 12:51 PM CDT -----  Ok, call or send card.

## 2018-08-28 RX ORDER — PAROXETINE HYDROCHLORIDE 20 MG/1
TABLET, FILM COATED ORAL
Qty: 45 TABLET | Refills: 2 | Status: SHIPPED | OUTPATIENT
Start: 2018-08-28 | End: 2018-08-29 | Stop reason: SDUPTHER

## 2018-08-29 RX ORDER — PAROXETINE HYDROCHLORIDE 20 MG/1
20 TABLET, FILM COATED ORAL DAILY
Qty: 30 TABLET | Refills: 2 | Status: SHIPPED | OUTPATIENT
Start: 2018-08-29 | End: 2018-10-29 | Stop reason: SDUPTHER

## 2018-10-24 ENCOUNTER — TELEPHONE (OUTPATIENT)
Dept: FAMILY MEDICINE CLINIC | Facility: CLINIC | Age: 73
End: 2018-10-24

## 2018-10-29 ENCOUNTER — LAB (OUTPATIENT)
Dept: LAB | Facility: HOSPITAL | Age: 73
End: 2018-10-29

## 2018-10-29 ENCOUNTER — OFFICE VISIT (OUTPATIENT)
Dept: FAMILY MEDICINE CLINIC | Facility: CLINIC | Age: 73
End: 2018-10-29

## 2018-10-29 ENCOUNTER — TELEPHONE (OUTPATIENT)
Dept: FAMILY MEDICINE CLINIC | Facility: CLINIC | Age: 73
End: 2018-10-29

## 2018-10-29 VITALS
HEART RATE: 47 BPM | BODY MASS INDEX: 22.74 KG/M2 | WEIGHT: 153.5 LBS | DIASTOLIC BLOOD PRESSURE: 98 MMHG | HEIGHT: 69 IN | OXYGEN SATURATION: 99 % | SYSTOLIC BLOOD PRESSURE: 178 MMHG

## 2018-10-29 DIAGNOSIS — Z23 FLU VACCINE NEED: ICD-10-CM

## 2018-10-29 DIAGNOSIS — Z12.5 PROSTATE CANCER SCREENING: ICD-10-CM

## 2018-10-29 DIAGNOSIS — N18.30 CHRONIC KIDNEY DISEASE, STAGE III (MODERATE) (HCC): ICD-10-CM

## 2018-10-29 DIAGNOSIS — F32.89 OTHER DEPRESSION: ICD-10-CM

## 2018-10-29 DIAGNOSIS — I10 ESSENTIAL HYPERTENSION: ICD-10-CM

## 2018-10-29 DIAGNOSIS — E78.00 PURE HYPERCHOLESTEROLEMIA: Primary | ICD-10-CM

## 2018-10-29 DIAGNOSIS — Z00.00 MEDICARE ANNUAL WELLNESS VISIT, INITIAL: ICD-10-CM

## 2018-10-29 DIAGNOSIS — R74.01 ELEVATED AST (SGOT): Primary | ICD-10-CM

## 2018-10-29 LAB
ALBUMIN SERPL-MCNC: 4 G/DL (ref 3.4–4.8)
ALBUMIN/GLOB SERPL: 1.2 G/DL (ref 1.1–1.8)
ALP SERPL-CCNC: 160 U/L (ref 38–126)
ALT SERPL W P-5'-P-CCNC: 41 U/L (ref 21–72)
ANION GAP SERPL CALCULATED.3IONS-SCNC: 6 MMOL/L (ref 5–15)
ARTICHOKE IGE QN: 46 MG/DL (ref 1–129)
AST SERPL-CCNC: 60 U/L (ref 17–59)
BILIRUB SERPL-MCNC: 1.1 MG/DL (ref 0.2–1.3)
BUN BLD-MCNC: 17 MG/DL (ref 7–21)
BUN/CREAT SERPL: 13.7 (ref 7–25)
CALCIUM SPEC-SCNC: 9.7 MG/DL (ref 8.4–10.2)
CHLORIDE SERPL-SCNC: 105 MMOL/L (ref 95–110)
CHOLEST SERPL-MCNC: 123 MG/DL (ref 0–199)
CO2 SERPL-SCNC: 25 MMOL/L (ref 22–31)
CREAT BLD-MCNC: 1.24 MG/DL (ref 0.7–1.3)
GFR SERPL CREATININE-BSD FRML MDRD: 57 ML/MIN/1.73 (ref 42–98)
GLOBULIN UR ELPH-MCNC: 3.3 GM/DL (ref 2.3–3.5)
GLUCOSE BLD-MCNC: 94 MG/DL (ref 60–100)
HDLC SERPL-MCNC: 68 MG/DL (ref 60–200)
LDLC/HDLC SERPL: 0.56 {RATIO} (ref 0–3.55)
POTASSIUM BLD-SCNC: 4 MMOL/L (ref 3.5–5.1)
PROT SERPL-MCNC: 7.3 G/DL (ref 6.3–8.6)
PSA SERPL-MCNC: 2.06 NG/ML (ref 0–4)
SODIUM BLD-SCNC: 136 MMOL/L (ref 137–145)
TRIGL SERPL-MCNC: 84 MG/DL (ref 20–199)

## 2018-10-29 PROCEDURE — 36415 COLL VENOUS BLD VENIPUNCTURE: CPT | Performed by: FAMILY MEDICINE

## 2018-10-29 PROCEDURE — G0438 PPPS, INITIAL VISIT: HCPCS | Performed by: FAMILY MEDICINE

## 2018-10-29 PROCEDURE — 99214 OFFICE O/P EST MOD 30 MIN: CPT | Performed by: FAMILY MEDICINE

## 2018-10-29 PROCEDURE — G0103 PSA SCREENING: HCPCS

## 2018-10-29 PROCEDURE — 80053 COMPREHEN METABOLIC PANEL: CPT | Performed by: FAMILY MEDICINE

## 2018-10-29 PROCEDURE — G0008 ADMIN INFLUENZA VIRUS VAC: HCPCS | Performed by: FAMILY MEDICINE

## 2018-10-29 PROCEDURE — 90662 IIV NO PRSV INCREASED AG IM: CPT | Performed by: FAMILY MEDICINE

## 2018-10-29 PROCEDURE — 80061 LIPID PANEL: CPT | Performed by: FAMILY MEDICINE

## 2018-10-29 RX ORDER — AMLODIPINE BESYLATE 10 MG/1
10 TABLET ORAL DAILY
Qty: 30 TABLET | Refills: 11 | Status: SHIPPED | OUTPATIENT
Start: 2018-10-29 | End: 2019-10-30 | Stop reason: SDUPTHER

## 2018-10-29 RX ORDER — PAROXETINE HYDROCHLORIDE 20 MG/1
20 TABLET, FILM COATED ORAL DAILY
Qty: 90 TABLET | Refills: 2 | Status: SHIPPED | OUTPATIENT
Start: 2018-10-29 | End: 2019-10-30 | Stop reason: SDUPTHER

## 2018-10-29 NOTE — PROGRESS NOTES
Pr Dr. Oden, Mr. Abdul has been called with his recent lab results & recommendations.  Continue his current medications and follow-up as planned or sooner if any problems.    Lab and US Ordered

## 2018-10-29 NOTE — TELEPHONE ENCOUNTER
Pr Dr. Oden, Mr. Abdul has been called with his recent lab results & recommendations.  Continue his current medications and follow-up as planned or sooner if any problems.    Lab and US Ordered      ----- Message from Shelton Oden MD sent at 10/29/2018  3:51 PM CDT -----  AST is elevated.  Recommend CBC, ferritin, daily hepatitis panel, and ultrasound the liver.

## 2018-10-29 NOTE — PROGRESS NOTES
Subjective     Chief Complaint   Patient presents with   • Annual Exam     Medicare Wellness Visit     Subjective   Clifford Abdul is a 73 y.o. male.     Hypertension   This is a chronic problem. The current episode started more than 1 month ago. The problem has been gradually worsening since onset. The problem is uncontrolled. Pertinent negatives include no anxiety, blurred vision, chest pain, malaise/fatigue, orthopnea, peripheral edema, PND or shortness of breath. There are no associated agents to hypertension. Risk factors for coronary artery disease include male gender. Current antihypertension treatment includes ACE inhibitors, angiotensin blockers and calcium channel blockers. The current treatment provides mild improvement. There are no compliance problems.  Hypertensive end-organ damage includes kidney disease. There is no history of angina or PVD.   Chronic Kidney Disease   This is a chronic problem. The current episode started more than 1 year ago. The problem has been unchanged. Pertinent negatives include no chest pain or myalgias.   Hyperlipidemia   This is a chronic problem. The current episode started more than 1 year ago. The problem is controlled. Recent lipid tests were reviewed and are normal. Pertinent negatives include no chest pain, myalgias or shortness of breath.   Depression   Visit Type: follow-up  Patient is not experiencing: depressed mood, insomnia, irritability, nervousness/anxiety and shortness of breath.         The following portions of the patient's history were reviewed and updated as appropriate: allergies, current medications, past social history and problem list.    Review of Systems   Constitutional: Negative for irritability, malaise/fatigue and unexpected weight change.   Eyes: Negative for blurred vision.   Respiratory: Negative.  Negative for shortness of breath and wheezing.    Cardiovascular: Negative.  Negative for chest pain, orthopnea, leg swelling and PND.  "  Gastrointestinal: Negative.    Endocrine: Negative.    Genitourinary: Negative.    Musculoskeletal: Negative.  Negative for myalgias.   Skin: Negative.    Allergic/Immunologic: Negative.    Neurological: Negative.    Hematological: Negative.    Psychiatric/Behavioral: The patient is not nervous/anxious and does not have insomnia.        Objective   /98   Pulse (!) 47   Ht 175.3 cm (69\")   Wt 69.6 kg (153 lb 8 oz)   SpO2 99%   BMI 22.67 kg/m²   Physical Exam   Constitutional: He is oriented to person, place, and time. He appears well-developed and well-nourished.   HENT:   Head: Normocephalic and atraumatic.   Eyes: EOM are normal. Pupils are equal, round, and reactive to light.   Neck: Neck supple.   Cardiovascular: Normal rate, regular rhythm, normal heart sounds and intact distal pulses.  Exam reveals no gallop and no friction rub.    No murmur heard.  Pulmonary/Chest: Effort normal and breath sounds normal. He has no wheezes. He has no rales. He exhibits no tenderness.   Abdominal: Soft. Normal appearance and bowel sounds are normal.   Musculoskeletal: He exhibits no edema or tenderness.     Vascular Status -  His right foot exhibits abnormal foot vasculature . His right foot exhibits no edema. His left foot exhibits abnormal foot vasculature . His left foot exhibits no edema.  Neurological: He is alert and oriented to person, place, and time. GCS eye subscore is 4. GCS verbal subscore is 5. GCS motor subscore is 6.   Skin: Skin is warm and dry.   Psychiatric: He has a normal mood and affect. His speech is normal and behavior is normal. Thought content normal.   Nursing note and vitals reviewed.      Assessment/Plan   Problem List Items Addressed This Visit     None           New Medications Ordered This Visit   Medications   • PARoxetine (PAXIL) 20 MG tablet     Sig: Take 1 tablet by mouth Daily.     Dispense:  90 tablet     Refill:  2      continue meds as directed asael Abdul is a 73 " y.o. male.     History of Present Illness     {Common H&P Review Areas:77071}    Review of Systems    Objective   Physical Exam    Assessment/Plan   {Assess/PlanSmartLinks:38773}

## 2018-10-29 NOTE — PROGRESS NOTES
Chief Complaint   Patient presents with   • Annual Exam     Medicare Wellness Visit     Subjective   Clifford Abdul is a 73 y.o. male.     Hypertension   This is a chronic problem. The current episode started more than 1 month ago. The problem has been gradually worsening since onset. The problem is uncontrolled. Pertinent negatives include no anxiety, blurred vision, chest pain, malaise/fatigue, orthopnea, peripheral edema, PND or shortness of breath. There are no associated agents to hypertension. Risk factors for coronary artery disease include male gender. Current antihypertension treatment includes ACE inhibitors, angiotensin blockers and calcium channel blockers. The current treatment provides mild improvement. There are no compliance problems.  Hypertensive end-organ damage includes kidney disease. There is no history of angina or PVD.   Chronic Kidney Disease   This is a chronic problem. The current episode started more than 1 year ago. The problem has been unchanged. Pertinent negatives include no chest pain or myalgias.   Hyperlipidemia   This is a chronic problem. The current episode started more than 1 year ago. The problem is controlled. Recent lipid tests were reviewed and are normal. Pertinent negatives include no chest pain, myalgias or shortness of breath.   Depression   Visit Type: follow-up  Patient is not experiencing: depressed mood, insomnia, irritability, nervousness/anxiety and shortness of breath.         The following portions of the patient's history were reviewed and updated as appropriate: allergies, current medications, past social history and problem list.    Review of Systems   Constitutional: Negative for irritability, malaise/fatigue and unexpected weight change.   Eyes: Negative for blurred vision.   Respiratory: Negative.  Negative for shortness of breath and wheezing.    Cardiovascular: Negative.  Negative for chest pain, orthopnea, leg swelling and PND.   Gastrointestinal:  "Negative.    Endocrine: Negative.    Genitourinary: Negative.    Musculoskeletal: Negative.  Negative for myalgias.   Skin: Negative.    Allergic/Immunologic: Negative.    Neurological: Negative.    Hematological: Negative.    Psychiatric/Behavioral: The patient is not nervous/anxious and does not have insomnia.        Objective   /98   Pulse (!) 47   Ht 175.3 cm (69\")   Wt 69.6 kg (153 lb 8 oz)   SpO2 99%   BMI 22.67 kg/m²   Physical Exam   Constitutional: He is oriented to person, place, and time. He appears well-developed and well-nourished.   HENT:   Head: Normocephalic and atraumatic.   Eyes: Pupils are equal, round, and reactive to light. EOM are normal.   Neck: Neck supple.   Cardiovascular: Regular rhythm, normal heart sounds and intact distal pulses.  Bradycardia present.  Exam reveals no gallop and no friction rub.    No murmur heard.  Pulmonary/Chest: Effort normal and breath sounds normal. He has no wheezes. He has no rales. He exhibits no tenderness.   Abdominal: Soft. Normal appearance and bowel sounds are normal.   Musculoskeletal: He exhibits no edema or tenderness.     Vascular Status -  His right foot exhibits abnormal foot vasculature . His right foot exhibits no edema. His left foot exhibits abnormal foot vasculature . His left foot exhibits no edema.  Neurological: He is alert and oriented to person, place, and time. GCS eye subscore is 4. GCS verbal subscore is 5. GCS motor subscore is 6.   Skin: Skin is warm and dry.   Psychiatric: He has a normal mood and affect. His speech is normal and behavior is normal. Thought content normal.   Nursing note and vitals reviewed.      Assessment/Plan   Problem List Items Addressed This Visit        Cardiovascular and Mediastinum    Essential hypertension    Relevant Medications    amLODIPine (NORVASC) 10 MG tablet    Other Relevant Orders    Comprehensive Metabolic Panel    Pure hypercholesterolemia - Primary    Relevant Orders    Lipid Panel    "    Genitourinary    Chronic kidney disease, stage III (moderate) (CMS/[facility])       Other    Depression    Relevant Medications    PARoxetine (PAXIL) 20 MG tablet      Other Visit Diagnoses     Prostate cancer screening        Relevant Orders    PSA Screen    Medicare annual wellness visit, initial               New Medications Ordered This Visit   Medications   • PARoxetine (PAXIL) 20 MG tablet     Sig: Take 1 tablet by mouth Daily.     Dispense:  90 tablet     Refill:  2   • amLODIPine (NORVASC) 10 MG tablet     Sig: Take 1 tablet by mouth Daily.     Dispense:  30 tablet     Refill:  11

## 2018-10-29 NOTE — PROGRESS NOTES
QUICK REFERENCE INFORMATION:  The ABCs of the Annual Wellness Visit    Initial Medicare Wellness Visit    HEALTH RISK ASSESSMENT    1945    Recent Hospitalizations:  No hospitalization(s) within the last year..        Current Medical Providers:  Patient Care Team:  Shelton Oden MD as PCP - General  Shelton Oden MD as PCP - Claims Attributed        Smoking Status:  History   Smoking Status   • Never Smoker   Smokeless Tobacco   • Never Used       Alcohol Consumption:  History   Alcohol use Not on file       Depression Screen:   PHQ-2/PHQ-9 Depression Screening 10/29/2018   Little interest or pleasure in doing things 0   Feeling down, depressed, or hopeless 0   Trouble falling or staying asleep, or sleeping too much 0   Feeling tired or having little energy 0   Poor appetite or overeating 0   Feeling bad about yourself - or that you are a failure or have let yourself or your family down 0   Trouble concentrating on things, such as reading the newspaper or watching television 0   Moving or speaking so slowly that other people could have noticed. Or the opposite - being so fidgety or restless that you have been moving around a lot more than usual 0   Thoughts that you would be better off dead, or of hurting yourself in some way 0   Total Score 0   If you checked off any problems, how difficult have these problems made it for you to do your work, take care of things at home, or get along with other people? Not difficult at all       Health Habits and Functional and Cognitive Screening:  Functional & Cognitive Status 10/29/2018   Do you have difficulty preparing food and eating? No   Do you have difficulty bathing yourself, getting dressed or grooming yourself? No   Do you have difficulty using the toilet? No   Do you have difficulty moving around from place to place? No   Do you have trouble with steps or getting out of a bed or a chair? No   In the past year have you fallen or experienced a near fall? No    Current Diet Well Balanced Diet   Dental Exam Not up to date   Eye Exam Up to date   Exercise (times per week) 5 times per week   Current Exercise Activities Include Walking   Do you need help using the phone?  No   Are you deaf or do you have serious difficulty hearing?  No   Do you need help with transportation? No   Do you need help shopping? No   Do you need help preparing meals?  No   Do you need help with housework?  No   Do you need help with laundry? No   Do you need help taking your medications? No   Do you need help managing money? No   Do you ever drive or ride in a car without wearing a seat belt? No   Have you felt unusual stress, anger or loneliness in the last month? No   Who do you live with? Spouse   If you need help, do you have trouble finding someone available to you? No   Have you been bothered in the last four weeks by sexual problems? No   Do you have difficulty concentrating, remembering or making decisions? No           Does the patient have evidence of cognitive impairment? No    Asiprin use counseling: Taking ASA appropriately as indicated      Recent Lab Results:    Visual Acuity:  Vision Screening Comments: Patient had eye exam on 04/27/18    Age-appropriate Screening Schedule:  Refer to the list below for future screening recommendations based on patient's age, sex and/or medical conditions. Orders for these recommended tests are listed in the plan section. The patient has been provided with a written plan.    Health Maintenance   Topic Date Due   • COLONOSCOPY  10/14/2016   • ZOSTER VACCINE (2 of 2) 06/09/2017   • INFLUENZA VACCINE  08/01/2018   • LIPID PANEL  04/27/2019   • TDAP/TD VACCINES (2 - Td) 10/14/2026   • PNEUMOCOCCAL VACCINES (65+ LOW/MEDIUM RISK)  Completed        Subjective   History of Present Illness    Clifford Hair Abdul is a 73 y.o. male who presents for an Annual Wellness Visit.    The following portions of the patient's history were reviewed and updated as  "appropriate: allergies, current medications, past family history, past medical history, past social history, past surgical history and problem list.    Outpatient Medications Prior to Visit   Medication Sig Dispense Refill   • amLODIPine (NORVASC) 5 MG tablet Take 1 tablet by mouth Daily. 90 tablet 4   • aspirin 81 MG EC tablet Take 81 mg by mouth Daily.     • atorvastatin (LIPITOR) 40 MG tablet Take 1 tablet by mouth Every Night. 30 tablet 11   • lisinopril (PRINIVIL,ZESTRIL) 40 MG tablet Take 1 tablet by mouth Daily. 90 tablet 3   • metoprolol succinate XL (TOPROL-XL) 50 MG 24 hr tablet Take 1 tablet by mouth Daily. 90 tablet 3   • traZODone (DESYREL) 100 MG tablet TAKE ONE-HALF TO ONE TABLET BY MOUTH EVERY NIGHT AT BEDTIME FOR SLEEP 90 tablet 1   • PARoxetine (PAXIL) 20 MG tablet Take 1 tablet by mouth Daily. 30 tablet 2     No facility-administered medications prior to visit.        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    Review of Systems    Compared to one year ago, the patient feels his physical health is the same.  Compared to one year ago, the patient feels his mental health is the same.    Objective     Physical Exam    Vitals:    10/29/18 0755   BP: 178/98   Pulse: (!) 47   SpO2: 99%   Weight: 69.6 kg (153 lb 8 oz)   Height: 175.3 cm (69\")   PainSc: 0-No pain       Patient's Body mass index is 22.67 kg/m². BMI is within normal parameters. No follow-up required.      Assessment/Plan   Patient Self-Management and Personalized Health Advice  The patient has been provided with information about: exercise and preventive services including: "   · Advance directive.    Visit Diagnoses:  No diagnosis found.    No orders of the defined types were placed in this encounter.      Outpatient Encounter Prescriptions as of 10/29/2018   Medication Sig Dispense Refill   • amLODIPine (NORVASC) 5 MG tablet Take 1 tablet by mouth Daily. 90 tablet 4   • aspirin 81 MG EC tablet Take 81 mg by mouth Daily.     • atorvastatin (LIPITOR) 40 MG tablet Take 1 tablet by mouth Every Night. 30 tablet 11   • lisinopril (PRINIVIL,ZESTRIL) 40 MG tablet Take 1 tablet by mouth Daily. 90 tablet 3   • metoprolol succinate XL (TOPROL-XL) 50 MG 24 hr tablet Take 1 tablet by mouth Daily. 90 tablet 3   • PARoxetine (PAXIL) 20 MG tablet Take 1 tablet by mouth Daily. 90 tablet 2   • traZODone (DESYREL) 100 MG tablet TAKE ONE-HALF TO ONE TABLET BY MOUTH EVERY NIGHT AT BEDTIME FOR SLEEP 90 tablet 1   • [DISCONTINUED] PARoxetine (PAXIL) 20 MG tablet Take 1 tablet by mouth Daily. 30 tablet 2     No facility-administered encounter medications on file as of 10/29/2018.        Reviewed use of high risk medication in the elderly: yes  Reviewed for potential of harmful drug interactions in the elderly: yes    Follow Up:  No Follow-up on file.     An After Visit Summary and PPPS with all of these plans were given to the patient.

## 2018-11-05 ENCOUNTER — TELEPHONE (OUTPATIENT)
Dept: FAMILY MEDICINE CLINIC | Facility: CLINIC | Age: 73
End: 2018-11-05

## 2018-11-05 ENCOUNTER — HOSPITAL ENCOUNTER (OUTPATIENT)
Dept: ULTRASOUND IMAGING | Facility: HOSPITAL | Age: 73
Discharge: HOME | End: 2018-11-05
Attending: FAMILY MEDICINE | Admitting: FAMILY MEDICINE

## 2018-11-05 ENCOUNTER — LAB (OUTPATIENT)
Dept: LAB | Facility: HOSPITAL | Age: 73
End: 2018-11-05
Attending: FAMILY MEDICINE

## 2018-11-05 DIAGNOSIS — R74.01 ELEVATED AST (SGOT): ICD-10-CM

## 2018-11-05 LAB
BASOPHILS # BLD AUTO: 0.03 10*3/MM3 (ref 0–0.2)
BASOPHILS NFR BLD AUTO: 0.4 % (ref 0–2)
DEPRECATED RDW RBC AUTO: 40.8 FL (ref 35.1–43.9)
EOSINOPHIL # BLD AUTO: 0.29 10*3/MM3 (ref 0–0.7)
EOSINOPHIL NFR BLD AUTO: 3.6 % (ref 0–7)
ERYTHROCYTE [DISTWIDTH] IN BLOOD BY AUTOMATED COUNT: 12.9 % (ref 11.5–14.5)
FERRITIN SERPL-MCNC: 107 NG/ML (ref 17.9–464)
HCT VFR BLD AUTO: 48.2 % (ref 39–49)
HGB BLD-MCNC: 17.1 G/DL (ref 13.7–17.3)
IMM GRANULOCYTES # BLD: 0.02 10*3/MM3 (ref 0–0.02)
IMM GRANULOCYTES NFR BLD: 0.2 % (ref 0–0.5)
LYMPHOCYTES # BLD AUTO: 2.1 10*3/MM3 (ref 0.6–4.2)
LYMPHOCYTES NFR BLD AUTO: 26.1 % (ref 10–50)
MCH RBC QN AUTO: 30.7 PG (ref 26.5–34)
MCHC RBC AUTO-ENTMCNC: 35.5 G/DL (ref 31.5–36.3)
MCV RBC AUTO: 86.5 FL (ref 80–98)
MONOCYTES # BLD AUTO: 0.48 10*3/MM3 (ref 0–0.9)
MONOCYTES NFR BLD AUTO: 6 % (ref 0–12)
NEUTROPHILS # BLD AUTO: 5.14 10*3/MM3 (ref 2–8.6)
NEUTROPHILS NFR BLD AUTO: 63.7 % (ref 37–80)
PLATELET # BLD AUTO: 194 10*3/MM3 (ref 150–450)
PMV BLD AUTO: 11 FL (ref 8–12)
RBC # BLD AUTO: 5.57 10*6/MM3 (ref 4.37–5.74)
WBC NRBC COR # BLD: 8.06 10*3/MM3 (ref 3.2–9.8)

## 2018-11-05 PROCEDURE — 76705 ECHO EXAM OF ABDOMEN: CPT

## 2018-11-05 PROCEDURE — 85025 COMPLETE CBC W/AUTO DIFF WBC: CPT

## 2018-11-05 PROCEDURE — 80074 ACUTE HEPATITIS PANEL: CPT

## 2018-11-05 PROCEDURE — 36415 COLL VENOUS BLD VENIPUNCTURE: CPT

## 2018-11-05 PROCEDURE — 82728 ASSAY OF FERRITIN: CPT

## 2018-11-05 NOTE — PROGRESS NOTES
Pr Dr. Oden, Mr. Abdul has been called with his recent lab & US results & recommendations.  Continue his current medications and follow-up as planned or sooner if any problems.

## 2018-11-05 NOTE — TELEPHONE ENCOUNTER
Pr Dr. Oden, Mr. Abdul has been called with his recent lab & US results & recommendations.  Continue his current medications and follow-up as planned or sooner if any problems.      ----- Message from Shelton Oden MD sent at 11/5/2018  3:58 PM CST -----  Ok, call or send card.

## 2018-11-05 NOTE — TELEPHONE ENCOUNTER
Pr Dr. Oden, Mr. Abdul has been called with his recent lab & US results & recommendations.  Continue his current medications and follow-up as planned or sooner if any problems.      ----- Message from Shelton Oden MD sent at 11/5/2018  4:01 PM CST -----  Normal by radiology report

## 2018-11-07 ENCOUNTER — TELEPHONE (OUTPATIENT)
Dept: FAMILY MEDICINE CLINIC | Facility: CLINIC | Age: 73
End: 2018-11-07

## 2018-11-07 LAB
HAV IGM SERPL QL IA: NEGATIVE
HBV CORE IGM SERPL QL IA: NEGATIVE
HBV SURFACE AG SERPL QL IA: NEGATIVE
HCV AB SER DONR QL: NEGATIVE

## 2018-11-07 NOTE — TELEPHONE ENCOUNTER
-Pr Dr. Oden, Mr. Abdul has been called with his recent lab results & recommendations.  Continue his current medications and follow-up as planned or sooner if any problems.        ---- Message from Shelton Oden MD sent at 11/7/2018 12:18 PM CST -----  Ok, call or send card.

## 2019-01-11 RX ORDER — ATORVASTATIN CALCIUM 40 MG/1
TABLET, FILM COATED ORAL
Qty: 30 TABLET | Refills: 11 | Status: SHIPPED | OUTPATIENT
Start: 2019-01-11 | End: 2020-02-07

## 2019-04-24 RX ORDER — METOPROLOL SUCCINATE 50 MG/1
TABLET, EXTENDED RELEASE ORAL
Qty: 90 TABLET | Refills: 3 | Status: SHIPPED | OUTPATIENT
Start: 2019-04-24 | End: 2020-04-20

## 2019-04-24 RX ORDER — LISINOPRIL 40 MG/1
TABLET ORAL
Qty: 90 TABLET | Refills: 3 | Status: SHIPPED | OUTPATIENT
Start: 2019-04-24 | End: 2020-04-20

## 2019-04-25 ENCOUNTER — LAB (OUTPATIENT)
Dept: LAB | Facility: HOSPITAL | Age: 74
End: 2019-04-25

## 2019-04-25 ENCOUNTER — OFFICE VISIT (OUTPATIENT)
Dept: FAMILY MEDICINE CLINIC | Facility: CLINIC | Age: 74
End: 2019-04-25

## 2019-04-25 VITALS
SYSTOLIC BLOOD PRESSURE: 126 MMHG | OXYGEN SATURATION: 97 % | HEIGHT: 69 IN | WEIGHT: 156 LBS | HEART RATE: 64 BPM | DIASTOLIC BLOOD PRESSURE: 72 MMHG | BODY MASS INDEX: 23.11 KG/M2

## 2019-04-25 DIAGNOSIS — F32.89 OTHER DEPRESSION: ICD-10-CM

## 2019-04-25 DIAGNOSIS — E78.00 PURE HYPERCHOLESTEROLEMIA: ICD-10-CM

## 2019-04-25 DIAGNOSIS — N18.30 CHRONIC KIDNEY DISEASE, STAGE III (MODERATE) (HCC): ICD-10-CM

## 2019-04-25 DIAGNOSIS — Z12.5 PROSTATE CANCER SCREENING: ICD-10-CM

## 2019-04-25 DIAGNOSIS — I10 ESSENTIAL HYPERTENSION: Primary | ICD-10-CM

## 2019-04-25 LAB
ALBUMIN SERPL-MCNC: 4.1 G/DL (ref 3.5–5.2)
ALBUMIN/GLOB SERPL: 1.2 G/DL
ALP SERPL-CCNC: 157 U/L (ref 39–117)
ALT SERPL W P-5'-P-CCNC: 31 U/L (ref 1–41)
ANION GAP SERPL CALCULATED.3IONS-SCNC: 10.5 MMOL/L
AST SERPL-CCNC: 30 U/L (ref 1–40)
BILIRUB SERPL-MCNC: 0.8 MG/DL (ref 0.2–1.2)
BUN BLD-MCNC: 14 MG/DL (ref 8–23)
BUN/CREAT SERPL: 9.5 (ref 7–25)
CALCIUM SPEC-SCNC: 10 MG/DL (ref 8.6–10.5)
CHLORIDE SERPL-SCNC: 104 MMOL/L (ref 98–107)
CHOLEST SERPL-MCNC: 122 MG/DL (ref 0–200)
CO2 SERPL-SCNC: 26.5 MMOL/L (ref 22–29)
CREAT BLD-MCNC: 1.47 MG/DL (ref 0.76–1.27)
GFR SERPL CREATININE-BSD FRML MDRD: 47 ML/MIN/1.73
GLOBULIN UR ELPH-MCNC: 3.3 GM/DL
GLUCOSE BLD-MCNC: 96 MG/DL (ref 65–99)
HDLC SERPL-MCNC: 59 MG/DL (ref 40–60)
LDLC SERPL CALC-MCNC: 43 MG/DL (ref 0–100)
LDLC/HDLC SERPL: 0.74 {RATIO}
POTASSIUM BLD-SCNC: 4.6 MMOL/L (ref 3.5–5.2)
PROT SERPL-MCNC: 7.4 G/DL (ref 6–8.5)
PSA SERPL-MCNC: 5.53 NG/ML (ref 0–4)
SODIUM BLD-SCNC: 141 MMOL/L (ref 136–145)
TRIGL SERPL-MCNC: 98 MG/DL (ref 0–150)
VLDLC SERPL-MCNC: 19.6 MG/DL (ref 5–40)

## 2019-04-25 PROCEDURE — 80061 LIPID PANEL: CPT | Performed by: FAMILY MEDICINE

## 2019-04-25 PROCEDURE — 99214 OFFICE O/P EST MOD 30 MIN: CPT | Performed by: FAMILY MEDICINE

## 2019-04-25 PROCEDURE — 36415 COLL VENOUS BLD VENIPUNCTURE: CPT

## 2019-04-25 PROCEDURE — 80053 COMPREHEN METABOLIC PANEL: CPT | Performed by: FAMILY MEDICINE

## 2019-04-25 PROCEDURE — G0103 PSA SCREENING: HCPCS

## 2019-04-25 NOTE — PROGRESS NOTES
Chief Complaint   Patient presents with   • Hypertension   • Hyperlipidemia     Subjective   Clifford Abdul is a 73 y.o. male.     Hypertension   This is a chronic problem. The current episode started more than 1 month ago. The problem has been gradually worsening since onset. The problem is uncontrolled. Pertinent negatives include no anxiety, blurred vision, chest pain, malaise/fatigue, orthopnea, peripheral edema, PND or shortness of breath. There are no associated agents to hypertension. Risk factors for coronary artery disease include male gender. Current antihypertension treatment includes ACE inhibitors, angiotensin blockers and calcium channel blockers. The current treatment provides mild improvement. There are no compliance problems.  Hypertensive end-organ damage includes kidney disease. There is no history of angina or PVD.   Hyperlipidemia   This is a chronic problem. The current episode started more than 1 year ago. The problem is controlled. Recent lipid tests were reviewed and are normal. Pertinent negatives include no chest pain, myalgias or shortness of breath.   Chronic Kidney Disease   This is a chronic problem. The current episode started more than 1 year ago. The problem has been unchanged. Pertinent negatives include no chest pain or myalgias.   Depression   Visit Type: follow-up  Patient is not experiencing: depressed mood, insomnia, irritability, nervousness/anxiety and shortness of breath.         The following portions of the patient's history were reviewed and updated as appropriate: allergies, current medications, past social history and problem list.    Review of Systems   Constitutional: Negative for irritability, malaise/fatigue and unexpected weight change.   Eyes: Negative for blurred vision.   Respiratory: Negative.  Negative for shortness of breath and wheezing.    Cardiovascular: Negative.  Negative for chest pain, orthopnea, leg swelling and PND.   Gastrointestinal: Negative.   "  Endocrine: Negative.    Genitourinary: Negative.    Musculoskeletal: Negative.  Negative for myalgias.   Skin: Negative.    Allergic/Immunologic: Negative.    Neurological: Negative.    Hematological: Negative.    Psychiatric/Behavioral: The patient is not nervous/anxious and does not have insomnia.        Objective   /72   Pulse 64   Ht 175.3 cm (69\")   Wt 70.8 kg (156 lb)   SpO2 97%   BMI 23.04 kg/m²   Physical Exam   Constitutional: He is oriented to person, place, and time. He appears well-developed and well-nourished.   HENT:   Head: Normocephalic and atraumatic.   Eyes: EOM are normal. Pupils are equal, round, and reactive to light.   Neck: Neck supple.   Cardiovascular: Regular rhythm, normal heart sounds and intact distal pulses. Bradycardia present. Exam reveals no gallop and no friction rub.   No murmur heard.  Pulmonary/Chest: Effort normal and breath sounds normal. He has no wheezes. He has no rales. He exhibits no tenderness.   Abdominal: Soft. Normal appearance and bowel sounds are normal.   Musculoskeletal: He exhibits no edema or tenderness.     Vascular Status -  His right foot exhibits abnormal foot vasculature . His right foot exhibits no edema. His left foot exhibits abnormal foot vasculature . His left foot exhibits no edema.  Neurological: He is alert and oriented to person, place, and time. GCS eye subscore is 4. GCS verbal subscore is 5. GCS motor subscore is 6.   Skin: Skin is warm and dry.   Psychiatric: He has a normal mood and affect. His speech is normal and behavior is normal. Thought content normal.   Nursing note and vitals reviewed.      Assessment/Plan   Problem List Items Addressed This Visit        Cardiovascular and Mediastinum    Essential hypertension - Primary    Pure hypercholesterolemia       Genitourinary    Chronic kidney disease, stage III (moderate) (CMS/HCC)       Other    Depression      Other Visit Diagnoses     Prostate cancer screening            Cont " medication

## 2019-04-29 ENCOUNTER — TELEPHONE (OUTPATIENT)
Dept: FAMILY MEDICINE CLINIC | Facility: CLINIC | Age: 74
End: 2019-04-29

## 2019-04-29 DIAGNOSIS — R94.4 RENAL FUNCTION TEST ABNORMAL: ICD-10-CM

## 2019-04-29 DIAGNOSIS — R97.20 ELEVATED PSA: Primary | ICD-10-CM

## 2019-04-29 RX ORDER — SULFAMETHOXAZOLE AND TRIMETHOPRIM 800; 160 MG/1; MG/1
1 TABLET ORAL 2 TIMES DAILY
Qty: 20 TABLET | Refills: 0 | Status: SHIPPED | OUTPATIENT
Start: 2019-04-29 | End: 2019-10-30

## 2019-04-29 NOTE — TELEPHONE ENCOUNTER
Per Dr. Oden, Mr. Abdul has been called with recent lab results & recommendations.  Continue current medications and follow-up as planned or sooner if any problems.    Appt Scheduled for Tuesday May 14 @ 8:15  He was advised to come to the lab on Monday May 13 and have labs drawn  Script sent to [facility] in [place]        ----- Message from Shelton Oden MD sent at 4/29/2019 10:58 AM CDT -----  Renal function a little worse and PSA is elevated.  I want to call him in some Bactrim DS 1 p.o. twice daily for 10 days.  Have him see me in 2 weeks.  Please have him get a renal function panel and PSA the day before I see him.  We will do a prostate exam on that appointment

## 2019-04-29 NOTE — PROGRESS NOTES
Renal function a little worse and PSA is elevated.  I want to call him in some Bactrim DS 1 p.o. twice daily for 10 days.  Have him see me in 2 weeks.  Please have him get a renal function panel and PSA the day before I see him.  We will do a prostate exam on that appointment

## 2019-05-08 ENCOUNTER — TELEPHONE (OUTPATIENT)
Dept: FAMILY MEDICINE CLINIC | Facility: CLINIC | Age: 74
End: 2019-05-08

## 2019-05-08 NOTE — TELEPHONE ENCOUNTER
Clifford said he is going to do his labs on Monday, but will have to cancel his Tuesday appt, he has to have his wife in [place] on Tuesday, she has cancer.  He said he will call and schedule appt for results when they are back.

## 2019-05-13 ENCOUNTER — LAB (OUTPATIENT)
Dept: LAB | Facility: HOSPITAL | Age: 74
End: 2019-05-13

## 2019-05-13 DIAGNOSIS — R94.4 RENAL FUNCTION TEST ABNORMAL: ICD-10-CM

## 2019-05-13 DIAGNOSIS — R97.20 ELEVATED PSA: ICD-10-CM

## 2019-05-13 LAB
ALBUMIN SERPL-MCNC: 4.1 G/DL (ref 3.5–5.2)
ALBUMIN/GLOB SERPL: 1.3 G/DL
ALP SERPL-CCNC: 138 U/L (ref 39–117)
ALT SERPL W P-5'-P-CCNC: 31 U/L (ref 1–41)
ANION GAP SERPL CALCULATED.3IONS-SCNC: 12.5 MMOL/L
AST SERPL-CCNC: 32 U/L (ref 1–40)
BILIRUB SERPL-MCNC: 0.7 MG/DL (ref 0.2–1.2)
BUN BLD-MCNC: 14 MG/DL (ref 8–23)
BUN/CREAT SERPL: 8.1 (ref 7–25)
CALCIUM SPEC-SCNC: 9.9 MG/DL (ref 8.6–10.5)
CHLORIDE SERPL-SCNC: 97 MMOL/L (ref 98–107)
CO2 SERPL-SCNC: 23.5 MMOL/L (ref 22–29)
CREAT BLD-MCNC: 1.73 MG/DL (ref 0.76–1.27)
GFR SERPL CREATININE-BSD FRML MDRD: 39 ML/MIN/1.73
GLOBULIN UR ELPH-MCNC: 3.2 GM/DL
GLUCOSE BLD-MCNC: 92 MG/DL (ref 65–99)
POTASSIUM BLD-SCNC: 4.4 MMOL/L (ref 3.5–5.2)
PROT SERPL-MCNC: 7.3 G/DL (ref 6–8.5)
PSA SERPL-MCNC: 4.5 NG/ML (ref 0–4)
SODIUM BLD-SCNC: 133 MMOL/L (ref 136–145)

## 2019-05-13 PROCEDURE — 36415 COLL VENOUS BLD VENIPUNCTURE: CPT

## 2019-05-13 PROCEDURE — 84153 ASSAY OF PSA TOTAL: CPT

## 2019-05-13 PROCEDURE — 80053 COMPREHEN METABOLIC PANEL: CPT

## 2019-05-15 ENCOUNTER — TELEPHONE (OUTPATIENT)
Dept: FAMILY MEDICINE CLINIC | Facility: CLINIC | Age: 74
End: 2019-05-15

## 2019-05-15 NOTE — TELEPHONE ENCOUNTER
No results from Dr Oden sent to either [facility] or [facility] yet.  One of us will call him when we get the results from Dr Oden.

## 2019-05-19 NOTE — PROGRESS NOTES
PSA is slightly improved but still elevated.  His renal function is worse.  Remind him to avoid nonsteroidal anti-inflammatories get an ultrasound of the kidneys and referral to nephrology.  Remind him that I want to see him for follow-up after the antibiotics to check his prostate.  He does not want to see me make an appointment with urology.

## 2019-05-20 ENCOUNTER — TELEPHONE (OUTPATIENT)
Dept: FAMILY MEDICINE CLINIC | Facility: CLINIC | Age: 74
End: 2019-05-20

## 2019-05-20 DIAGNOSIS — N18.30 CHRONIC KIDNEY DISEASE, STAGE III (MODERATE) (HCC): Primary | ICD-10-CM

## 2019-05-20 DIAGNOSIS — R94.4 RENAL FUNCTION TEST ABNORMAL: ICD-10-CM

## 2019-05-20 DIAGNOSIS — I10 ESSENTIAL HYPERTENSION: ICD-10-CM

## 2019-05-20 NOTE — TELEPHONE ENCOUNTER
Per Dr. Oden, Mr. Abdul has been called with recent lab results & recommendations.  Continue current medications and follow-up as planned or sooner if any problems.    Referral sent to Nephrology and for US of the Kidneys      ----- Message from Shelton Oden MD sent at 5/19/2019 10:18 AM CDT -----  PSA is slightly improved but still elevated.  His renal function is worse.  Remind him to avoid nonsteroidal anti-inflammatories get an ultrasound of the kidneys and referral to nephrology.  Remind him that I want to see him for follow-up after the antibiotics to check his prostate.  He does not want to see me make an appointment with urology.

## 2019-05-20 NOTE — PROGRESS NOTES
Per Dr. Oden, Mr. Abdul has been called with recent lab results & recommendations.  Continue current medications and follow-up as planned or sooner if any problems.    Referral sent to Nephrology and for US of the Kidneys

## 2019-05-23 ENCOUNTER — OFFICE VISIT (OUTPATIENT)
Dept: FAMILY MEDICINE CLINIC | Facility: CLINIC | Age: 74
End: 2019-05-23

## 2019-05-23 ENCOUNTER — HOSPITAL ENCOUNTER (OUTPATIENT)
Dept: ULTRASOUND IMAGING | Facility: HOSPITAL | Age: 74
Discharge: HOME | End: 2019-05-23
Admitting: FAMILY MEDICINE

## 2019-05-23 VITALS
OXYGEN SATURATION: 97 % | DIASTOLIC BLOOD PRESSURE: 76 MMHG | SYSTOLIC BLOOD PRESSURE: 122 MMHG | HEIGHT: 69 IN | BODY MASS INDEX: 23.05 KG/M2 | HEART RATE: 51 BPM | WEIGHT: 155.6 LBS

## 2019-05-23 DIAGNOSIS — R94.4 RENAL FUNCTION TEST ABNORMAL: ICD-10-CM

## 2019-05-23 DIAGNOSIS — Z12.5 ENCOUNTER FOR SCREENING FOR MALIGNANT NEOPLASM OF PROSTATE: ICD-10-CM

## 2019-05-23 DIAGNOSIS — R79.89 ELEVATED SERUM CREATININE: ICD-10-CM

## 2019-05-23 DIAGNOSIS — N41.0 ACUTE PROSTATITIS: Primary | ICD-10-CM

## 2019-05-23 DIAGNOSIS — N18.30 CHRONIC KIDNEY DISEASE, STAGE III (MODERATE) (HCC): ICD-10-CM

## 2019-05-23 DIAGNOSIS — I10 ESSENTIAL HYPERTENSION: ICD-10-CM

## 2019-05-23 DIAGNOSIS — R97.20 ELEVATED PSA: ICD-10-CM

## 2019-05-23 LAB
BILIRUB BLD-MCNC: NEGATIVE MG/DL
CLARITY, POC: ABNORMAL
COLOR UR: YELLOW
GLUCOSE UR STRIP-MCNC: NEGATIVE MG/DL
KETONES UR QL: NEGATIVE
LEUKOCYTE EST, POC: ABNORMAL
NITRITE UR-MCNC: NEGATIVE MG/ML
PH UR: 5 [PH] (ref 5–8)
PROT UR STRIP-MCNC: ABNORMAL MG/DL
RBC # UR STRIP: ABNORMAL /UL
SP GR UR: 1.02 (ref 1–1.03)
UROBILINOGEN UR QL: NORMAL

## 2019-05-23 PROCEDURE — 99213 OFFICE O/P EST LOW 20 MIN: CPT | Performed by: FAMILY MEDICINE

## 2019-05-23 PROCEDURE — 76775 US EXAM ABDO BACK WALL LIM: CPT

## 2019-05-23 PROCEDURE — 81002 URINALYSIS NONAUTO W/O SCOPE: CPT | Performed by: FAMILY MEDICINE

## 2019-05-23 RX ORDER — SULFAMETHOXAZOLE AND TRIMETHOPRIM 800; 160 MG/1; MG/1
1 TABLET ORAL 2 TIMES DAILY
Qty: 60 TABLET | Refills: 0 | Status: SHIPPED | OUTPATIENT
Start: 2019-05-23 | End: 2019-06-22

## 2019-05-23 NOTE — PROGRESS NOTES
Subjective   Clifford Abdul is a 73 y.o. male.     Urinary Tract Infection    This is a new problem. The current episode started more than 1 month ago. The problem has been unchanged. The patient is experiencing no pain. There has been no fever. Pertinent negatives include no chills or hematuria. He has tried antibiotics for the symptoms. The treatment provided no relief.        The following portions of the patient's history were reviewed and updated as appropriate: allergies, current medications, past family history, past medical history, past social history, past surgical history and problem list.    Review of Systems   Constitutional: Negative for chills.   Genitourinary: Negative for hematuria.       Objective   Physical Exam   Constitutional: He is oriented to person, place, and time. He appears well-developed and well-nourished. No distress.   HENT:   Head: Normocephalic and atraumatic.   Genitourinary: Rectal exam shows external hemorrhoid. Rectal exam shows no internal hemorrhoid, no mass, no tenderness and guaiac negative stool. Prostate is enlarged. Prostate is not tender.   Neurological: He is alert and oriented to person, place, and time.   Skin: Skin is warm and dry. He is not diaphoretic.   Psychiatric: He has a normal mood and affect. His behavior is normal. Judgment and thought content normal.   Nursing note and vitals reviewed.      Assessment/Plan   Problems Addressed this Visit     None      Visit Diagnoses     Acute prostatitis    -  Primary    Elevated PSA        Relevant Orders    PSA Screen    Elevated serum creatinine        Relevant Orders    Renal Function Panel    Encounter for screening for malignant neoplasm of prostate         Relevant Orders    PSA Screen               Had an elevated creatinine up to 1.73 and a PSA above 5.  Hydrating with couple weeks antibodies PSA came down to 4.53 we checked his urine today after the prostate exam and still has a prostate infection.  Will treat  with 1 month of Bactrim and recheck PSA and renal function panel then.  Will call results to him and if still elevated and treated appropriately.

## 2019-05-24 ENCOUNTER — TELEPHONE (OUTPATIENT)
Dept: FAMILY MEDICINE CLINIC | Facility: CLINIC | Age: 74
End: 2019-05-24

## 2019-05-24 NOTE — PROGRESS NOTES
Per Dr. Oden, Mr. Abdul has been called with recent Bilateral Renal Ultrasound results & recommendations.  Continue current medications and follow-up as planned or sooner if any problems.

## 2019-05-24 NOTE — TELEPHONE ENCOUNTER
Per Dr. Oden, Mr. Abdul has been called with recent Bilateral Renal Ultrasound results & recommendations.  Continue current medications and follow-up as planned or sooner if any problems.      ----- Message from Shelton Oden MD sent at 5/24/2019  2:52 PM CDT -----  Enlarged prostate but kidneys look fine.  Continue current recommendations

## 2019-06-25 ENCOUNTER — LAB (OUTPATIENT)
Dept: LAB | Facility: HOSPITAL | Age: 74
End: 2019-06-25

## 2019-06-25 DIAGNOSIS — R79.89 ELEVATED SERUM CREATININE: ICD-10-CM

## 2019-06-25 DIAGNOSIS — R97.20 ELEVATED PSA: ICD-10-CM

## 2019-06-25 DIAGNOSIS — Z12.5 ENCOUNTER FOR SCREENING FOR MALIGNANT NEOPLASM OF PROSTATE: ICD-10-CM

## 2019-06-25 LAB
ALBUMIN SERPL-MCNC: 4 G/DL (ref 3.5–5.2)
ANION GAP SERPL CALCULATED.3IONS-SCNC: 10.1 MMOL/L
BUN BLD-MCNC: 16 MG/DL (ref 8–23)
BUN/CREAT SERPL: 8.6 (ref 7–25)
CALCIUM SPEC-SCNC: 9.9 MG/DL (ref 8.6–10.5)
CHLORIDE SERPL-SCNC: 103 MMOL/L (ref 98–107)
CO2 SERPL-SCNC: 23.9 MMOL/L (ref 22–29)
CREAT BLD-MCNC: 1.85 MG/DL (ref 0.76–1.27)
GFR SERPL CREATININE-BSD FRML MDRD: 36 ML/MIN/1.73
GLUCOSE BLD-MCNC: 103 MG/DL (ref 65–99)
PHOSPHATE SERPL-MCNC: 2.9 MG/DL (ref 2.5–4.5)
POTASSIUM BLD-SCNC: 4.6 MMOL/L (ref 3.5–5.2)
PSA SERPL-MCNC: 3.52 NG/ML (ref 0–4)
SODIUM BLD-SCNC: 137 MMOL/L (ref 136–145)

## 2019-06-25 PROCEDURE — G0103 PSA SCREENING: HCPCS

## 2019-06-25 PROCEDURE — 36415 COLL VENOUS BLD VENIPUNCTURE: CPT

## 2019-06-25 PROCEDURE — 80069 RENAL FUNCTION PANEL: CPT

## 2019-06-25 NOTE — PROGRESS NOTES
PSA is returned to normal however renal function continues to worsen.  Recommend continue to avoid nonsteroidal anti-inflammatories, continue to increase fluids to 6 to 8 glasses water a day, get ultrasound of the kidneys and nephrology consult.

## 2019-06-26 ENCOUNTER — TELEPHONE (OUTPATIENT)
Dept: FAMILY MEDICINE CLINIC | Facility: CLINIC | Age: 74
End: 2019-06-26

## 2019-06-26 NOTE — TELEPHONE ENCOUNTER
Per Dr. Oden,  Mr. Abdul has been called with recent lab results & recommendations.  Continue current medications and follow-up as planned or sooner if any problems.    Dr. Oden,  He has already had the Bilateral Renal US completed in May and a referral to Nephrology back in May.  He had an appointment scheduled with them but he was still in [place] with his wife, so he canceled the appt with Nephrology and has yet to reschedule  I explained that I understand that he wants to be with her during her treatments, I also told him that he did not need to put his health concerns aside.  I tried to stress the importance of calling the Nephrologist and reschedule his appointment.  He agreed.  I advise him to call and reschedule with Nephrology and to call us if he had any problems getting the appointment        ----- Message from Shelton Oden MD sent at 6/25/2019  2:58 PM CDT -----  PSA is returned to normal however renal function continues to worsen.  Recommend continue to avoid nonsteroidal anti-inflammatories, continue to increase fluids to 6 to 8 glasses water a day, get ultrasound of the kidneys and nephrology consult.

## 2019-06-26 NOTE — PROGRESS NOTES
Per Dr. Oden,  Mr. Abdul has been called with recent lab results & recommendations.  Continue current medications and follow-up as planned or sooner if any problems.    Dr. Oden,  He has already had the Bilateral Renal US completed in May and a referral to Nephrology back in May.  He had an appointment scheduled with them but he was still in [place] with his wife, so he canceled the appt with Nephrology and has yet to reschedule  I explained that I understand that he wants to be with her during her treatments, I also told him that he did not need to put his health concerns aside.  I tried to stress the importance of calling the Nephrologist and reschedule his appointment.  He agreed.  I advise him to call and reschedule with Nephrology and to call us if he had any problems getting the appointment

## 2019-08-09 ENCOUNTER — TRANSCRIBE ORDERS (OUTPATIENT)
Dept: LAB | Facility: HOSPITAL | Age: 74
End: 2019-08-09

## 2019-08-09 ENCOUNTER — LAB (OUTPATIENT)
Dept: LAB | Facility: HOSPITAL | Age: 74
End: 2019-08-09

## 2019-08-09 DIAGNOSIS — N18.30 CHRONIC KIDNEY DISEASE, STAGE III (MODERATE) (HCC): ICD-10-CM

## 2019-08-09 DIAGNOSIS — I10 ESSENTIAL HYPERTENSION, MALIGNANT: ICD-10-CM

## 2019-08-09 DIAGNOSIS — N17.9 ACUTE NONTRAUMATIC KIDNEY INJURY (HCC): ICD-10-CM

## 2019-08-09 DIAGNOSIS — N17.9 ACUTE NONTRAUMATIC KIDNEY INJURY (HCC): Primary | ICD-10-CM

## 2019-08-09 LAB
ALBUMIN SERPL-MCNC: 4.5 G/DL (ref 3.5–5.2)
ANION GAP SERPL CALCULATED.3IONS-SCNC: 13.6 MMOL/L (ref 5–15)
BUN BLD-MCNC: 16 MG/DL (ref 8–23)
BUN/CREAT SERPL: 11.9 (ref 7–25)
CALCIUM SPEC-SCNC: 9.4 MG/DL (ref 8.6–10.5)
CHLORIDE SERPL-SCNC: 100 MMOL/L (ref 98–107)
CO2 SERPL-SCNC: 25.4 MMOL/L (ref 22–29)
CREAT BLD-MCNC: 1.34 MG/DL (ref 0.76–1.27)
GFR SERPL CREATININE-BSD FRML MDRD: 52 ML/MIN/1.73
GLUCOSE BLD-MCNC: 94 MG/DL (ref 65–99)
HANSEL STAIN: NEGATIVE
PHOSPHATE SERPL-MCNC: 3.9 MG/DL (ref 2.5–4.5)
POTASSIUM BLD-SCNC: 4.4 MMOL/L (ref 3.5–5.2)
SODIUM BLD-SCNC: 139 MMOL/L (ref 136–145)

## 2019-08-09 PROCEDURE — 36415 COLL VENOUS BLD VENIPUNCTURE: CPT

## 2019-08-09 PROCEDURE — 80069 RENAL FUNCTION PANEL: CPT

## 2019-08-09 PROCEDURE — 87205 SMEAR GRAM STAIN: CPT

## 2019-10-24 ENCOUNTER — TELEPHONE (OUTPATIENT)
Dept: FAMILY MEDICINE CLINIC | Facility: CLINIC | Age: 74
End: 2019-10-24

## 2019-10-30 ENCOUNTER — OFFICE VISIT (OUTPATIENT)
Dept: FAMILY MEDICINE CLINIC | Facility: CLINIC | Age: 74
End: 2019-10-30

## 2019-10-30 VITALS
SYSTOLIC BLOOD PRESSURE: 136 MMHG | HEIGHT: 69 IN | BODY MASS INDEX: 23.27 KG/M2 | WEIGHT: 157.1 LBS | OXYGEN SATURATION: 98 % | DIASTOLIC BLOOD PRESSURE: 80 MMHG | HEART RATE: 50 BPM

## 2019-10-30 DIAGNOSIS — Z00.00 MEDICARE ANNUAL WELLNESS VISIT, SUBSEQUENT: ICD-10-CM

## 2019-10-30 DIAGNOSIS — F32.89 OTHER DEPRESSION: ICD-10-CM

## 2019-10-30 DIAGNOSIS — N18.30 CHRONIC KIDNEY DISEASE, STAGE III (MODERATE) (HCC): ICD-10-CM

## 2019-10-30 DIAGNOSIS — Z23 FLU VACCINE NEED: ICD-10-CM

## 2019-10-30 DIAGNOSIS — E78.00 PURE HYPERCHOLESTEROLEMIA: ICD-10-CM

## 2019-10-30 DIAGNOSIS — I10 ESSENTIAL HYPERTENSION: Primary | ICD-10-CM

## 2019-10-30 PROCEDURE — 99214 OFFICE O/P EST MOD 30 MIN: CPT | Performed by: FAMILY MEDICINE

## 2019-10-30 PROCEDURE — 90653 IIV ADJUVANT VACCINE IM: CPT | Performed by: FAMILY MEDICINE

## 2019-10-30 PROCEDURE — G0008 ADMIN INFLUENZA VIRUS VAC: HCPCS | Performed by: FAMILY MEDICINE

## 2019-10-30 PROCEDURE — G0439 PPPS, SUBSEQ VISIT: HCPCS | Performed by: FAMILY MEDICINE

## 2019-10-30 RX ORDER — TRAZODONE HYDROCHLORIDE 100 MG/1
TABLET ORAL
Qty: 90 TABLET | Refills: 1 | Status: SHIPPED | OUTPATIENT
Start: 2019-10-30 | End: 2020-11-02 | Stop reason: SDUPTHER

## 2019-10-30 RX ORDER — PAROXETINE HYDROCHLORIDE 20 MG/1
20 TABLET, FILM COATED ORAL DAILY
Qty: 90 TABLET | Refills: 2 | Status: SHIPPED | OUTPATIENT
Start: 2019-10-30 | End: 2020-11-02 | Stop reason: SDUPTHER

## 2019-10-30 RX ORDER — AMLODIPINE BESYLATE 10 MG/1
10 TABLET ORAL DAILY
Qty: 30 TABLET | Refills: 11 | Status: SHIPPED | OUTPATIENT
Start: 2019-10-30 | End: 2020-11-02 | Stop reason: SDUPTHER

## 2019-10-30 NOTE — PROGRESS NOTES
Chief Complaint   Patient presents with   • Annual Exam     MWV     Subjective   Clifford Abdul is a 74 y.o. male.     Hypertension   This is a chronic problem. The current episode started more than 1 month ago. The problem has been gradually worsening since onset. The problem is uncontrolled. Pertinent negatives include no anxiety, blurred vision, chest pain, malaise/fatigue, orthopnea, peripheral edema, PND or shortness of breath. There are no associated agents to hypertension. Risk factors for coronary artery disease include male gender. Current antihypertension treatment includes ACE inhibitors, angiotensin blockers and calcium channel blockers. The current treatment provides mild improvement. There are no compliance problems.  Hypertensive end-organ damage includes kidney disease. There is no history of angina or PVD.   Hyperlipidemia   This is a chronic problem. The current episode started more than 1 year ago. The problem is controlled. Recent lipid tests were reviewed and are normal. Pertinent negatives include no chest pain, myalgias or shortness of breath.   Chronic Kidney Disease   This is a chronic problem. The current episode started more than 1 year ago. The problem has been unchanged. Pertinent negatives include no chest pain or myalgias.   Depression   Visit Type: follow-up  Patient is not experiencing: depressed mood, insomnia, irritability, nervousness/anxiety and shortness of breath.         The following portions of the patient's history were reviewed and updated as appropriate: allergies, current medications, past social history and problem list.    Review of Systems   Constitutional: Negative for irritability, malaise/fatigue and unexpected weight change.   Eyes: Negative for blurred vision.   Respiratory: Negative.  Negative for shortness of breath and wheezing.    Cardiovascular: Negative.  Negative for chest pain, orthopnea, leg swelling and PND.   Gastrointestinal: Negative.   "  Endocrine: Negative.    Genitourinary: Negative.    Musculoskeletal: Negative.  Negative for myalgias.   Skin: Negative.    Allergic/Immunologic: Negative.    Neurological: Negative.    Hematological: Negative.    Psychiatric/Behavioral: The patient is not nervous/anxious and does not have insomnia.        Objective   /80   Pulse 50   Ht 175.3 cm (69\")   Wt 71.3 kg (157 lb 1.6 oz)   SpO2 98%   BMI 23.20 kg/m²   Physical Exam   Constitutional: He is oriented to person, place, and time. He appears well-developed and well-nourished.   HENT:   Head: Normocephalic and atraumatic.   Eyes: EOM are normal. Pupils are equal, round, and reactive to light.   Neck: Neck supple.   Cardiovascular: Regular rhythm, normal heart sounds and intact distal pulses. Bradycardia present. Exam reveals no gallop and no friction rub.   No murmur heard.  Pulmonary/Chest: Effort normal and breath sounds normal. He has no wheezes. He has no rales. He exhibits no tenderness.   Abdominal: Soft. Normal appearance and bowel sounds are normal.   Musculoskeletal: He exhibits no edema or tenderness.     Vascular Status -  His right foot exhibits abnormal foot vasculature . His right foot exhibits no edema. His left foot exhibits abnormal foot vasculature . His left foot exhibits no edema.  Neurological: He is alert and oriented to person, place, and time. GCS eye subscore is 4. GCS verbal subscore is 5. GCS motor subscore is 6.   Skin: Skin is warm and dry.   Psychiatric: He has a normal mood and affect. His speech is normal and behavior is normal. Thought content normal.   Nursing note and vitals reviewed.    /80   Pulse 50   Ht 175.3 cm (69\")   Wt 71.3 kg (157 lb 1.6 oz)   SpO2 98%   BMI 23.20 kg/m²     Assessment/Plan   Problem List Items Addressed This Visit        Cardiovascular and Mediastinum    Essential hypertension - Primary    Relevant Medications    amLODIPine (NORVASC) 10 MG tablet    Pure hypercholesterolemia    "    Genitourinary    Chronic kidney disease, stage III (moderate) (CMS/[facility])       Other    Depression    Relevant Medications    PARoxetine (PAXIL) 20 MG tablet    traZODone (DESYREL) 100 MG tablet      Other Visit Diagnoses     Medicare annual wellness visit, subsequent            Cont medication

## 2019-10-30 NOTE — PROGRESS NOTES
The ABCs of the Annual Wellness Visit  Subsequent Medicare Wellness Visit    Chief Complaint   Patient presents with   • Annual Exam     MWV       Subjective   History of Present Illness:  Clifford Abdul is a 74 y.o. male who presents for a Subsequent Medicare Wellness Visit.    HEALTH RISK ASSESSMENT    Recent Hospitalizations:  No hospitalization(s) within the last year.    Current Medical Providers:  Patient Care Team:  Sehlton Oden MD as PCP - General  Shelton Oden MD as PCP - Claims Attributed    Smoking Status:  Social History     Tobacco Use   Smoking Status Never Smoker   Smokeless Tobacco Never Used       Alcohol Consumption:  Social History     Substance and Sexual Activity   Alcohol Use Not on file       Depression Screen:   PHQ-2/PHQ-9 Depression Screening 10/30/2019   Little interest or pleasure in doing things 0   Feeling down, depressed, or hopeless 0   Trouble falling or staying asleep, or sleeping too much 0   Feeling tired or having little energy 0   Poor appetite or overeating 0   Feeling bad about yourself - or that you are a failure or have let yourself or your family down 0   Trouble concentrating on things, such as reading the newspaper or watching television 0   Moving or speaking so slowly that other people could have noticed. Or the opposite - being so fidgety or restless that you have been moving around a lot more than usual 0   Thoughts that you would be better off dead, or of hurting yourself in some way 0   Total Score 0   If you checked off any problems, how difficult have these problems made it for you to do your work, take care of things at home, or get along with other people? Not difficult at all       Fall Risk Screen:  STEADI Fall Risk Assessment has not been completed.    Health Habits and Functional and Cognitive Screening:  Functional & Cognitive Status 10/30/2019   Do you have difficulty preparing food and eating? No   Do you have difficulty bathing yourself,  getting dressed or grooming yourself? No   Do you have difficulty using the toilet? No   Do you have difficulty moving around from place to place? No   Do you have trouble with steps or getting out of a bed or a chair? No   Current Diet Well Balanced Diet   Dental Exam Up to date   Eye Exam Up to date   Exercise (times per week) 5 times per week   Current Exercise Activities Include Walking   Do you need help using the phone?  No   Are you deaf or do you have serious difficulty hearing?  No   Do you need help with transportation? No   Do you need help shopping? No   Do you need help preparing meals?  No   Do you need help with housework?  No   Do you need help with laundry? No   Do you need help taking your medications? No   Do you need help managing money? No   Do you ever drive or ride in a car without wearing a seat belt? No   Have you felt unusual stress, anger or loneliness in the last month? No   Who do you live with? Sibling   If you need help, do you have trouble finding someone available to you? No   Have you been bothered in the last four weeks by sexual problems? No   Do you have difficulty concentrating, remembering or making decisions? No         Does the patient have evidence of cognitive impairment? No    Asprin use counseling:Taking ASA appropriately as indicated    Age-appropriate Screening Schedule:  Refer to the list below for future screening recommendations based on patient's age, sex and/or medical conditions. Orders for these recommended tests are listed in the plan section. The patient has been provided with a written plan.    Health Maintenance   Topic Date Due   • ZOSTER VACCINE (2 of 2) 06/09/2017   • INFLUENZA VACCINE  08/01/2019   • LIPID PANEL  04/25/2020   • TDAP/TD VACCINES (2 - Td) 10/14/2026   • COLONOSCOPY  10/29/2028   • PNEUMOCOCCAL VACCINES (65+ LOW/MEDIUM RISK)  Completed          The following portions of the patient's history were reviewed and updated as appropriate:  "allergies, current medications, past family history, past medical history, past social history, past surgical history and problem list.    Outpatient Medications Prior to Visit   Medication Sig Dispense Refill   • aspirin 81 MG EC tablet Take 81 mg by mouth Daily.     • atorvastatin (LIPITOR) 40 MG tablet TAKE ONE TABLET BY MOUTH EVERY NIGHT 30 tablet 11   • lisinopril (PRINIVIL,ZESTRIL) 40 MG tablet TAKE 1 TABLET BY MOUTH ONCE DAILY 90 tablet 3   • metoprolol succinate XL (TOPROL-XL) 50 MG 24 hr tablet TAKE 1 TABLET BY MOUTH ONCE DAILY 90 tablet 3   • amLODIPine (NORVASC) 10 MG tablet Take 1 tablet by mouth Daily. 30 tablet 11   • PARoxetine (PAXIL) 20 MG tablet Take 1 tablet by mouth Daily. 90 tablet 2   • traZODone (DESYREL) 100 MG tablet TAKE ONE-HALF TO ONE TABLET BY MOUTH EVERY NIGHT AT BEDTIME FOR SLEEP 90 tablet 1   • sulfamethoxazole-trimethoprim (BACTRIM DS,SEPTRA DS) 800-160 MG per tablet Take 1 tablet by mouth 2 (Two) Times a Day. 20 tablet 0     No facility-administered medications prior to visit.        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  Compared to one year ago, the patient feels his mental health is the same.    Reviewed chart for potential of high risk medication in the elderly: yes  Reviewed chart for potential of harmful drug interactions in the elderly:yes    Objective         Vitals:    10/30/19 0819   BP: 136/80   Pulse: 50   SpO2: 98%   Weight: 71.3 kg (157 lb 1.6 oz)   Height: 175.3 cm (69\")   PainSc: 0-No pain       Body mass index is 23.2 kg/m².  Discussed the patient's BMI " with him. The BMI is in the acceptable range.    Physical Exam          Assessment/Plan   Medicare Risks and Personalized Health Plan  CMS Preventative Services Quick Reference  Advance Directive Discussion    The above risks/problems have been discussed with the patient.  Pertinent information has been shared with the patient in the After Visit Summary.  Follow up plans and orders are seen below in the Assessment/Plan Section.    There are no diagnoses linked to this encounter.  Follow Up:  No Follow-up on file.     An After Visit Summary and PPPS were given to the patient.

## 2019-10-30 NOTE — PROGRESS NOTES
There were no vitals taken for this visit.    There is no height or weight on file to calculate BMI.            This document has been electronically signed by Shelton Oden MD on October 30, 2019 7:59 AM

## 2020-02-07 RX ORDER — ATORVASTATIN CALCIUM 40 MG/1
TABLET, FILM COATED ORAL
Qty: 30 TABLET | Refills: 5 | Status: SHIPPED | OUTPATIENT
Start: 2020-02-07 | End: 2020-08-24

## 2020-04-20 ENCOUNTER — TELEPHONE (OUTPATIENT)
Dept: FAMILY MEDICINE CLINIC | Facility: CLINIC | Age: 75
End: 2020-04-20

## 2020-04-20 RX ORDER — LISINOPRIL 40 MG/1
TABLET ORAL
Qty: 90 TABLET | Refills: 0 | Status: SHIPPED | OUTPATIENT
Start: 2020-04-20 | End: 2020-04-30 | Stop reason: SDUPTHER

## 2020-04-20 RX ORDER — METOPROLOL SUCCINATE 50 MG/1
TABLET, EXTENDED RELEASE ORAL
Qty: 90 TABLET | Refills: 0 | Status: SHIPPED | OUTPATIENT
Start: 2020-04-20 | End: 2020-04-30 | Stop reason: SDUPTHER

## 2020-04-20 NOTE — TELEPHONE ENCOUNTER
Pt is needing his Toprol refilled, but he does not want to schedule his video visit any sooner, it is scheduled for April 30th.

## 2020-04-30 ENCOUNTER — TELEMEDICINE (OUTPATIENT)
Dept: FAMILY MEDICINE CLINIC | Facility: CLINIC | Age: 75
End: 2020-04-30

## 2020-04-30 DIAGNOSIS — I10 ESSENTIAL HYPERTENSION: ICD-10-CM

## 2020-04-30 DIAGNOSIS — E78.00 PURE HYPERCHOLESTEROLEMIA: Primary | ICD-10-CM

## 2020-04-30 PROCEDURE — 99214 OFFICE O/P EST MOD 30 MIN: CPT | Performed by: FAMILY MEDICINE

## 2020-04-30 RX ORDER — LISINOPRIL 40 MG/1
40 TABLET ORAL DAILY
Qty: 90 TABLET | Refills: 3 | Status: SHIPPED | OUTPATIENT
Start: 2020-04-30 | End: 2021-04-05 | Stop reason: SDUPTHER

## 2020-04-30 RX ORDER — METOPROLOL SUCCINATE 50 MG/1
50 TABLET, EXTENDED RELEASE ORAL DAILY
Qty: 90 TABLET | Refills: 3 | Status: SHIPPED | OUTPATIENT
Start: 2020-04-30 | End: 2021-04-05 | Stop reason: SDUPTHER

## 2020-04-30 NOTE — PROGRESS NOTES
No chief complaint on file.    Subjective   Clifford Abdul is a 74 y.o. male.     Hypertension   This is a chronic (140/70) problem. The current episode started more than 1 month ago. The problem has been gradually worsening since onset. The problem is uncontrolled. Pertinent negatives include no anxiety, blurred vision, chest pain, malaise/fatigue, orthopnea, peripheral edema, PND or shortness of breath. There are no associated agents to hypertension. Risk factors for coronary artery disease include male gender. Current antihypertension treatment includes ACE inhibitors, angiotensin blockers and calcium channel blockers. The current treatment provides mild improvement. There are no compliance problems.  Hypertensive end-organ damage includes kidney disease. There is no history of angina or PVD.   Hyperlipidemia   This is a chronic problem. The current episode started more than 1 year ago. The problem is controlled. Recent lipid tests were reviewed and are normal. Pertinent negatives include no chest pain, myalgias or shortness of breath.   Chronic Kidney Disease   This is a chronic problem. The current episode started more than 1 year ago. The problem has been unchanged. Pertinent negatives include no chest pain or myalgias.   Depression   Visit Type: follow-up  Patient is not experiencing: depressed mood, insomnia, irritability, nervousness/anxiety and shortness of breath.         The following portions of the patient's history were reviewed and updated as appropriate: allergies, current medications, past social history and problem list.    Review of Systems   Constitutional: Negative for irritability and malaise/fatigue.   Eyes: Negative for blurred vision.   Respiratory: Negative.  Negative for shortness of breath and wheezing.    Cardiovascular: Negative.  Negative for chest pain, orthopnea, leg swelling and PND.   Musculoskeletal: Negative for myalgias.   Psychiatric/Behavioral: The patient is not  nervous/anxious and does not have insomnia.        Objective   There were no vitals taken for this visit.  Physical Exam   Constitutional: He is oriented to person, place, and time. He appears well-developed and well-nourished.   HENT:   Head: Normocephalic and atraumatic.   Abdominal: Normal appearance.   Musculoskeletal: He exhibits no edema or tenderness.   Neurological: He is alert and oriented to person, place, and time.   Psychiatric: He has a normal mood and affect. His speech is normal and behavior is normal. Thought content normal.     There were no vitals taken for this visit.    Assessment/Plan   Problem List Items Addressed This Visit        Cardiovascular and Mediastinum    Essential hypertension    Relevant Medications    metoprolol succinate XL (TOPROL-XL) 50 MG 24 hr tablet    lisinopril (PRINIVIL,ZESTRIL) 40 MG tablet    Other Relevant Orders    Comprehensive Metabolic Panel    Pure hypercholesterolemia - Primary    Relevant Orders    Lipid Panel        You have chosen to receive care through a telehealth visit.  Do you consent to use a video/audio connection for your medical care today? Yes

## 2020-05-01 ENCOUNTER — APPOINTMENT (OUTPATIENT)
Dept: LAB | Facility: HOSPITAL | Age: 75
End: 2020-05-01

## 2020-05-01 LAB
ALBUMIN SERPL-MCNC: 4 G/DL (ref 3.5–5.2)
ALBUMIN/GLOB SERPL: 1.3 G/DL
ALP SERPL-CCNC: 137 U/L (ref 39–117)
ALT SERPL W P-5'-P-CCNC: 23 U/L (ref 1–41)
ANION GAP SERPL CALCULATED.3IONS-SCNC: 11.8 MMOL/L (ref 5–15)
AST SERPL-CCNC: 25 U/L (ref 1–40)
BILIRUB SERPL-MCNC: 0.8 MG/DL (ref 0.2–1.2)
BUN BLD-MCNC: 13 MG/DL (ref 8–23)
BUN/CREAT SERPL: 9.1 (ref 7–25)
CALCIUM SPEC-SCNC: 9.4 MG/DL (ref 8.6–10.5)
CHLORIDE SERPL-SCNC: 101 MMOL/L (ref 98–107)
CHOLEST SERPL-MCNC: 112 MG/DL (ref 0–200)
CO2 SERPL-SCNC: 26.2 MMOL/L (ref 22–29)
CREAT BLD-MCNC: 1.43 MG/DL (ref 0.76–1.27)
GFR SERPL CREATININE-BSD FRML MDRD: 49 ML/MIN/1.73
GLOBULIN UR ELPH-MCNC: 3.2 GM/DL
GLUCOSE BLD-MCNC: 91 MG/DL (ref 65–99)
HDLC SERPL-MCNC: 64 MG/DL (ref 40–60)
LDLC SERPL CALC-MCNC: 32 MG/DL (ref 0–100)
LDLC/HDLC SERPL: 0.5 {RATIO}
POTASSIUM BLD-SCNC: 4.2 MMOL/L (ref 3.5–5.2)
PROT SERPL-MCNC: 7.2 G/DL (ref 6–8.5)
SODIUM BLD-SCNC: 139 MMOL/L (ref 136–145)
TRIGL SERPL-MCNC: 80 MG/DL (ref 0–150)
VLDLC SERPL-MCNC: 16 MG/DL (ref 5–40)

## 2020-05-01 PROCEDURE — 80053 COMPREHEN METABOLIC PANEL: CPT | Performed by: FAMILY MEDICINE

## 2020-05-01 PROCEDURE — 80061 LIPID PANEL: CPT | Performed by: FAMILY MEDICINE

## 2020-05-01 PROCEDURE — 36415 COLL VENOUS BLD VENIPUNCTURE: CPT | Performed by: FAMILY MEDICINE

## 2020-05-05 ENCOUNTER — TELEPHONE (OUTPATIENT)
Dept: FAMILY MEDICINE CLINIC | Facility: CLINIC | Age: 75
End: 2020-05-05

## 2020-05-05 NOTE — TELEPHONE ENCOUNTER
Per Dr. Oden, Mr. Abdul has been called with recent lab results & recommendations.  Continue current medications and follow-up as planned or sooner if any problems.      ----- Message from Shelton Oden MD sent at 5/4/2020 10:34 AM CDT -----  Renal function stable otherwise okay

## 2020-08-14 ENCOUNTER — TRANSCRIBE ORDERS (OUTPATIENT)
Dept: LAB | Facility: HOSPITAL | Age: 75
End: 2020-08-14

## 2020-08-14 DIAGNOSIS — I10 ESSENTIAL (PRIMARY) HYPERTENSION: ICD-10-CM

## 2020-08-14 DIAGNOSIS — N18.30 CHRONIC KIDNEY DISEASE, STAGE III (MODERATE) (HCC): ICD-10-CM

## 2020-08-14 DIAGNOSIS — E78.5 HYPERLIPIDEMIA, UNSPECIFIED HYPERLIPIDEMIA TYPE: ICD-10-CM

## 2020-08-14 DIAGNOSIS — N17.9 ACUTE KIDNEY INJURY (NONTRAUMATIC) (HCC): Primary | ICD-10-CM

## 2020-08-17 ENCOUNTER — LAB (OUTPATIENT)
Dept: LAB | Facility: HOSPITAL | Age: 75
End: 2020-08-17

## 2020-08-17 DIAGNOSIS — I10 ESSENTIAL (PRIMARY) HYPERTENSION: ICD-10-CM

## 2020-08-17 DIAGNOSIS — N17.9 ACUTE KIDNEY INJURY (NONTRAUMATIC) (HCC): ICD-10-CM

## 2020-08-17 DIAGNOSIS — E78.5 HYPERLIPIDEMIA, UNSPECIFIED HYPERLIPIDEMIA TYPE: ICD-10-CM

## 2020-08-17 DIAGNOSIS — N18.30 CHRONIC KIDNEY DISEASE, STAGE III (MODERATE) (HCC): ICD-10-CM

## 2020-08-17 LAB
25(OH)D3 SERPL-MCNC: 51.7 NG/ML (ref 30–100)
ALBUMIN SERPL-MCNC: 4.4 G/DL (ref 3.5–5.2)
ANION GAP SERPL CALCULATED.3IONS-SCNC: 11.2 MMOL/L (ref 5–15)
BUN SERPL-MCNC: 13 MG/DL (ref 8–23)
BUN/CREAT SERPL: 9.1 (ref 7–25)
CALCIUM SPEC-SCNC: 10.2 MG/DL (ref 8.6–10.5)
CHLORIDE SERPL-SCNC: 103 MMOL/L (ref 98–107)
CO2 SERPL-SCNC: 26.8 MMOL/L (ref 22–29)
CREAT SERPL-MCNC: 1.43 MG/DL (ref 0.76–1.27)
CREAT UR-MCNC: 121.7 MG/DL
GFR SERPL CREATININE-BSD FRML MDRD: 48 ML/MIN/1.73
GLUCOSE SERPL-MCNC: 94 MG/DL (ref 65–99)
HCT VFR BLD AUTO: 47 % (ref 37.5–51)
HGB BLD-MCNC: 16.2 G/DL (ref 13–17.7)
PHOSPHATE SERPL-MCNC: 3.3 MG/DL (ref 2.5–4.5)
POTASSIUM SERPL-SCNC: 4.8 MMOL/L (ref 3.5–5.2)
PROT UR-MCNC: 7 MG/DL
PROT/CREAT UR: 57.5 MG/G CREA (ref 0–200)
PTH-INTACT SERPL-MCNC: 43.7 PG/ML (ref 15–65)
SODIUM SERPL-SCNC: 141 MMOL/L (ref 136–145)

## 2020-08-17 PROCEDURE — 80069 RENAL FUNCTION PANEL: CPT

## 2020-08-17 PROCEDURE — 82570 ASSAY OF URINE CREATININE: CPT

## 2020-08-17 PROCEDURE — 85014 HEMATOCRIT: CPT

## 2020-08-17 PROCEDURE — 83970 ASSAY OF PARATHORMONE: CPT

## 2020-08-17 PROCEDURE — 84156 ASSAY OF PROTEIN URINE: CPT

## 2020-08-17 PROCEDURE — 82306 VITAMIN D 25 HYDROXY: CPT

## 2020-08-17 PROCEDURE — 36415 COLL VENOUS BLD VENIPUNCTURE: CPT

## 2020-08-17 PROCEDURE — 85018 HEMOGLOBIN: CPT

## 2020-08-24 RX ORDER — ATORVASTATIN CALCIUM 40 MG/1
TABLET, FILM COATED ORAL
Qty: 30 TABLET | Refills: 5 | Status: SHIPPED | OUTPATIENT
Start: 2020-08-24 | End: 2021-03-16 | Stop reason: SDUPTHER

## 2020-11-02 ENCOUNTER — TELEMEDICINE (OUTPATIENT)
Dept: FAMILY MEDICINE CLINIC | Facility: CLINIC | Age: 75
End: 2020-11-02

## 2020-11-02 VITALS
BODY MASS INDEX: 22.66 KG/M2 | HEIGHT: 69 IN | DIASTOLIC BLOOD PRESSURE: 80 MMHG | WEIGHT: 153 LBS | SYSTOLIC BLOOD PRESSURE: 130 MMHG

## 2020-11-02 DIAGNOSIS — F32.4 MAJOR DEPRESSIVE DISORDER WITH SINGLE EPISODE, IN PARTIAL REMISSION (HCC): ICD-10-CM

## 2020-11-02 DIAGNOSIS — N18.31 STAGE 3A CHRONIC KIDNEY DISEASE (HCC): ICD-10-CM

## 2020-11-02 DIAGNOSIS — Z00.00 MEDICARE ANNUAL WELLNESS VISIT, SUBSEQUENT: ICD-10-CM

## 2020-11-02 DIAGNOSIS — E78.00 PURE HYPERCHOLESTEROLEMIA: ICD-10-CM

## 2020-11-02 DIAGNOSIS — H34.11 CENTRAL RETINAL ARTERY OCCLUSION OF RIGHT EYE: ICD-10-CM

## 2020-11-02 DIAGNOSIS — I10 ESSENTIAL HYPERTENSION: Primary | ICD-10-CM

## 2020-11-02 PROCEDURE — 99214 OFFICE O/P EST MOD 30 MIN: CPT | Performed by: FAMILY MEDICINE

## 2020-11-02 RX ORDER — TRAZODONE HYDROCHLORIDE 100 MG/1
TABLET ORAL
Qty: 90 TABLET | Refills: 0 | Status: SHIPPED | OUTPATIENT
Start: 2020-11-02 | End: 2021-04-05 | Stop reason: SDUPTHER

## 2020-11-02 RX ORDER — PAROXETINE HYDROCHLORIDE 20 MG/1
20 TABLET, FILM COATED ORAL DAILY
Qty: 90 TABLET | Refills: 0 | Status: SHIPPED | OUTPATIENT
Start: 2020-11-02 | End: 2021-04-05 | Stop reason: SDUPTHER

## 2020-11-02 RX ORDER — AMLODIPINE BESYLATE 10 MG/1
10 TABLET ORAL DAILY
Qty: 90 TABLET | Refills: 0 | Status: SHIPPED | OUTPATIENT
Start: 2020-11-02 | End: 2021-04-05 | Stop reason: SDUPTHER

## 2020-11-02 NOTE — PROGRESS NOTES
The ABCs of the Annual Wellness Visit  Subsequent Medicare Wellness Visit    No chief complaint on file.      Subjective   History of Present Illness:  Clifford Abdul is a 75 y.o. male who presents for a Subsequent Medicare Wellness Visit.    HEALTH RISK ASSESSMENT    Recent Hospitalizations:  No hospitalization(s) within the last year.    Current Medical Providers:  Patient Care Team:  Shelton Oden MD as PCP - General    Smoking Status:  Social History     Tobacco Use   Smoking Status Never Smoker   Smokeless Tobacco Never Used       Alcohol Consumption:  Social History     Substance and Sexual Activity   Alcohol Use Not on file       Depression Screen:   PHQ-2/PHQ-9 Depression Screening 10/30/2019   Little interest or pleasure in doing things 0   Feeling down, depressed, or hopeless 0   Trouble falling or staying asleep, or sleeping too much 0   Feeling tired or having little energy 0   Poor appetite or overeating 0   Feeling bad about yourself - or that you are a failure or have let yourself or your family down 0   Trouble concentrating on things, such as reading the newspaper or watching television 0   Moving or speaking so slowly that other people could have noticed. Or the opposite - being so fidgety or restless that you have been moving around a lot more than usual 0   Thoughts that you would be better off dead, or of hurting yourself in some way 0   Total Score 0   If you checked off any problems, how difficult have these problems made it for you to do your work, take care of things at home, or get along with other people? Not difficult at all       Fall Risk Screen:  YUADI Fall Risk Assessment has not been completed.    Health Habits and Functional and Cognitive Screening:  Functional & Cognitive Status 10/30/2019   Do you have difficulty preparing food and eating? No   Do you have difficulty bathing yourself, getting dressed or grooming yourself? No   Do you have difficulty using the toilet? No   Do  you have difficulty moving around from place to place? No   Do you have trouble with steps or getting out of a bed or a chair? No   Current Diet Well Balanced Diet   Dental Exam Up to date   Eye Exam Up to date   Exercise (times per week) 5 times per week   Current Exercise Activities Include Walking   Do you need help using the phone?  No   Are you deaf or do you have serious difficulty hearing?  No   Do you need help with transportation? No   Do you need help shopping? No   Do you need help preparing meals?  No   Do you need help with housework?  No   Do you need help with laundry? No   Do you need help taking your medications? No   Do you need help managing money? No   Do you ever drive or ride in a car without wearing a seat belt? No   Have you felt unusual stress, anger or loneliness in the last month? No   Who do you live with? Sibling   If you need help, do you have trouble finding someone available to you? No   Have you been bothered in the last four weeks by sexual problems? No   Do you have difficulty concentrating, remembering or making decisions? No         Does the patient have evidence of cognitive impairment? No    Asprin use counseling:Taking ASA appropriately as indicated    Age-appropriate Screening Schedule:  Refer to the list below for future screening recommendations based on patient's age, sex and/or medical conditions. Orders for these recommended tests are listed in the plan section. The patient has been provided with a written plan.    Health Maintenance   Topic Date Due   • ZOSTER VACCINE (2 of 2) 06/09/2017   • INFLUENZA VACCINE  08/01/2020   • LIPID PANEL  05/01/2021   • TDAP/TD VACCINES (2 - Td) 10/14/2026   • COLONOSCOPY  10/29/2028          The following portions of the patient's history were reviewed and updated as appropriate: allergies, current medications, past family history, past medical history, past social history, past surgical history and problem list.    Outpatient  Medications Prior to Visit   Medication Sig Dispense Refill   • amLODIPine (NORVASC) 10 MG tablet Take 1 tablet by mouth Daily. 30 tablet 11   • aspirin 81 MG EC tablet Take 81 mg by mouth Daily.     • atorvastatin (LIPITOR) 40 MG tablet TAKE 1 TABLET BY MOUTH AT NIGHT 30 tablet 5   • lisinopril (PRINIVIL,ZESTRIL) 40 MG tablet Take 1 tablet by mouth Daily. 90 tablet 3   • metoprolol succinate XL (TOPROL-XL) 50 MG 24 hr tablet Take 1 tablet by mouth Daily. 90 tablet 3   • PARoxetine (PAXIL) 20 MG tablet Take 1 tablet by mouth Daily. 90 tablet 2   • traZODone (DESYREL) 100 MG tablet TAKE ONE-HALF TO ONE TABLET BY MOUTH EVERY NIGHT AT BEDTIME FOR SLEEP 90 tablet 1     No facility-administered medications prior to visit.        Patient Active Problem List   Diagnosis   • Essential hypertension   • Chronic kidney disease, stage III (moderate)   • Central retinal artery occlusion of right eye   • Hypermetropia   • Pure hypercholesterolemia   • Depression       Advanced Care Planning:  ACP discussion was held with the patient during this visit. Patient does not have an advance directive, information provided.    Review of Systems    Compared to one year ago, the patient feels his physical health is the same.  Compared to one year ago, the patient feels his mental health is the same.    Reviewed chart for potential of high risk medication in the elderly: yes  Reviewed chart for potential of harmful drug interactions in the elderly:yes    Objective       There were no vitals filed for this visit.    There is no height or weight on file to calculate BMI.  Discussed the patient's BMI with him. The BMI is in the acceptable range.    Physical Exam          Assessment/Plan   Medicare Risks and Personalized Health Plan  CMS Preventative Services Quick Reference  Advance Directive Discussion    The above risks/problems have been discussed with the patient.  Pertinent information has been shared with the patient in the After Visit  Summary.  Follow up plans and orders are seen below in the Assessment/Plan Section.    There are no diagnoses linked to this encounter.  Follow Up:  No follow-ups on file.     An After Visit Summary and PPPS were given to the patient.

## 2020-11-02 NOTE — PROGRESS NOTES
Chief Complaint   Patient presents with   • Hypertension     Subjective   Clfiford Abdul is a 75 y.o. male.     Hypertension  This is a chronic (140/70) problem. The current episode started more than 1 year ago. The problem has been gradually worsening since onset. The problem is controlled. Pertinent negatives include no anxiety, blurred vision, chest pain, malaise/fatigue, orthopnea, peripheral edema, PND or shortness of breath. There are no associated agents to hypertension. Risk factors for coronary artery disease include male gender. Current antihypertension treatment includes ACE inhibitors, angiotensin blockers and calcium channel blockers. The current treatment provides mild improvement. There are no compliance problems.  Hypertensive end-organ damage includes kidney disease. There is no history of angina or PVD.   Hyperlipidemia  This is a chronic problem. The current episode started more than 1 year ago. The problem is controlled. Recent lipid tests were reviewed and are normal. Pertinent negatives include no chest pain, myalgias or shortness of breath.   Chronic Kidney Disease  This is a chronic problem. The current episode started more than 1 year ago. The problem has been unchanged. Pertinent negatives include no chest pain, fever or myalgias.   Depression  Visit Type: follow-up  Patient is not experiencing: depressed mood, insomnia, irritability, nervousness/anxiety and shortness of breath.         The following portions of the patient's history were reviewed and updated as appropriate: allergies, current medications, past social history and problem list.    Review of Systems   Constitutional: Negative for fever, irritability and malaise/fatigue.   Eyes: Negative for blurred vision.   Respiratory: Negative.  Negative for shortness of breath and wheezing.    Cardiovascular: Negative.  Negative for chest pain, orthopnea, leg swelling and PND.   Musculoskeletal: Negative for myalgias.  "  Psychiatric/Behavioral: The patient is not nervous/anxious and does not have insomnia.        Objective   /80   Ht 175.3 cm (69\")   Wt 69.4 kg (153 lb)   BMI 22.59 kg/m²   Physical Exam   Constitutional: He is oriented to person, place, and time. He appears well-developed.   Abdominal: Normal appearance.   Neurological: He is alert and oriented to person, place, and time.   Psychiatric: His speech is normal and behavior is normal. Thought content normal.     /80   Ht 175.3 cm (69\")   Wt 69.4 kg (153 lb)   BMI 22.59 kg/m²     Assessment/Plan   Problems Addressed this Visit        Cardiovascular and Mediastinum    Essential hypertension - Primary    Relevant Medications    amLODIPine (NORVASC) 10 MG tablet    Central retinal artery occlusion of right eye    Pure hypercholesterolemia       Genitourinary    Chronic kidney disease, stage III (moderate)       Other    Depression    Relevant Medications    traZODone (DESYREL) 100 MG tablet    PARoxetine (PAXIL) 20 MG tablet      Other Visit Diagnoses     Medicare annual wellness visit, subsequent          Diagnoses       Codes Comments    Essential hypertension    -  Primary ICD-10-CM: I10  ICD-9-CM: 401.9     Pure hypercholesterolemia     ICD-10-CM: E78.00  ICD-9-CM: 272.0     Stage 3a chronic kidney disease     ICD-10-CM: N18.31  ICD-9-CM: 585.3     Major depressive disorder with single episode, in partial remission (CMS/[facility])     ICD-10-CM: F32.4  ICD-9-CM: 296.25     Medicare annual wellness visit, subsequent     ICD-10-CM: Z00.00  ICD-9-CM: V70.0     Central retinal artery occlusion of right eye     ICD-10-CM: H34.11  ICD-9-CM: 362.31       I attest all information history and review of system is accurate.  For hypertension continue lisinopril metoprolol and amlodipine.  For hyperlipidemia continue atorvastatin.  For depression and partial remission continue paroxetine and trazodone.  For history of central retinal artery occlusion of the right " continue aspirin.  You have chosen to receive care through a telephone visit. Do you consent to use a telephone visit for your medical care today? Yes  This visit has been rescheduled as a phone visit to comply with patient safety concerns in accordance with CDC recommendations. Total time of discussion was 21 minutes.

## 2020-11-02 NOTE — PATIENT INSTRUCTIONS
Medicare Wellness  Personal Prevention Plan of Service     Date of Office Visit:  2020  Encounter Provider:  Shelton Oden MD  Place of Service:  [facility]  Patient Name: Clifford Abdul  :  1945    As part of the Medicare Wellness portion of your visit today, we are providing you with this personalized preventive plan of services (PPPS). This plan is based upon recommendations of the [place] Preventive Services Task Force (USPSTF) and the Advisory Committee on Immunization Practices (ACIP).    This lists the preventive care services that should be considered, and provides dates of when you are due. Items listed as completed are up-to-date and do not require any further intervention.    Health Maintenance   Topic Date Due   • ZOSTER VACCINE (2 of 2) 2017   • ANNUAL WELLNESS VISIT  10/30/2020   • LIPID PANEL  2021   • TDAP/TD VACCINES (2 - Td) 10/14/2026   • COLONOSCOPY  10/29/2028   • HEPATITIS C SCREENING  Completed   • INFLUENZA VACCINE  Completed   • Pneumococcal Vaccine 65+  Completed   • Hepatitis B  Aged Out       No orders of the defined types were placed in this encounter.      No follow-ups on file.

## 2021-02-16 ENCOUNTER — IMMUNIZATION (OUTPATIENT)
Dept: VACCINE CLINIC | Facility: HOSPITAL | Age: 76
End: 2021-02-16

## 2021-02-16 PROCEDURE — 0001A: CPT | Performed by: THORACIC SURGERY (CARDIOTHORACIC VASCULAR SURGERY)

## 2021-02-16 PROCEDURE — 91300 HC SARSCOV02 VAC 30MCG/0.3ML IM: CPT | Performed by: THORACIC SURGERY (CARDIOTHORACIC VASCULAR SURGERY)

## 2021-03-09 ENCOUNTER — IMMUNIZATION (OUTPATIENT)
Dept: VACCINE CLINIC | Facility: HOSPITAL | Age: 76
End: 2021-03-09

## 2021-03-09 PROCEDURE — 91300 HC SARSCOV02 VAC 30MCG/0.3ML IM: CPT | Performed by: THORACIC SURGERY (CARDIOTHORACIC VASCULAR SURGERY)

## 2021-03-09 PROCEDURE — 0002A: CPT | Performed by: THORACIC SURGERY (CARDIOTHORACIC VASCULAR SURGERY)

## 2021-03-16 RX ORDER — ATORVASTATIN CALCIUM 40 MG/1
40 TABLET, FILM COATED ORAL
Qty: 90 TABLET | Refills: 1 | Status: SHIPPED | OUTPATIENT
Start: 2021-03-16 | End: 2021-04-05 | Stop reason: SDUPTHER

## 2021-03-16 NOTE — TELEPHONE ENCOUNTER
Dr. Oden Patient,     Last OV Dr Oden Telemed 11/02/2020    Next [facility] Dr Camilo 04/05/2021

## 2021-03-16 NOTE — TELEPHONE ENCOUNTER
Pt needs refill of Atorvastatin 40 mg 90 day supply sent to [facility] in [place]. He has an appt with you on 4-5-21.

## 2021-04-05 ENCOUNTER — OFFICE VISIT (OUTPATIENT)
Dept: FAMILY MEDICINE CLINIC | Facility: CLINIC | Age: 76
End: 2021-04-05

## 2021-04-05 ENCOUNTER — LAB (OUTPATIENT)
Dept: LAB | Facility: HOSPITAL | Age: 76
End: 2021-04-05

## 2021-04-05 VITALS
BODY MASS INDEX: 23.7 KG/M2 | SYSTOLIC BLOOD PRESSURE: 142 MMHG | HEIGHT: 69 IN | DIASTOLIC BLOOD PRESSURE: 90 MMHG | WEIGHT: 160 LBS | OXYGEN SATURATION: 98 % | HEART RATE: 48 BPM

## 2021-04-05 DIAGNOSIS — F32.4 MAJOR DEPRESSIVE DISORDER WITH SINGLE EPISODE, IN PARTIAL REMISSION (HCC): ICD-10-CM

## 2021-04-05 DIAGNOSIS — E78.00 PURE HYPERCHOLESTEROLEMIA: ICD-10-CM

## 2021-04-05 DIAGNOSIS — N18.31 STAGE 3A CHRONIC KIDNEY DISEASE (HCC): ICD-10-CM

## 2021-04-05 DIAGNOSIS — Z00.00 MEDICARE ANNUAL WELLNESS VISIT, SUBSEQUENT: Primary | ICD-10-CM

## 2021-04-05 DIAGNOSIS — I10 ESSENTIAL HYPERTENSION: ICD-10-CM

## 2021-04-05 LAB
ALBUMIN SERPL-MCNC: 4.5 G/DL (ref 3.5–5.2)
ALBUMIN/GLOB SERPL: 1.7 G/DL
ALP SERPL-CCNC: 159 U/L (ref 39–117)
ALT SERPL W P-5'-P-CCNC: 22 U/L (ref 1–41)
ANION GAP SERPL CALCULATED.3IONS-SCNC: 12 MMOL/L (ref 5–15)
AST SERPL-CCNC: 28 U/L (ref 1–40)
BILIRUB SERPL-MCNC: 0.8 MG/DL (ref 0–1.2)
BUN SERPL-MCNC: 16 MG/DL (ref 8–23)
BUN/CREAT SERPL: 12.4 (ref 7–25)
CALCIUM SPEC-SCNC: 9.7 MG/DL (ref 8.6–10.5)
CHLORIDE SERPL-SCNC: 100 MMOL/L (ref 98–107)
CO2 SERPL-SCNC: 28 MMOL/L (ref 22–29)
CREAT SERPL-MCNC: 1.29 MG/DL (ref 0.76–1.27)
GFR SERPL CREATININE-BSD FRML MDRD: 55 ML/MIN/1.73
GLOBULIN UR ELPH-MCNC: 2.6 GM/DL
GLUCOSE SERPL-MCNC: 92 MG/DL (ref 65–99)
POTASSIUM SERPL-SCNC: 4.3 MMOL/L (ref 3.5–5.2)
PROT SERPL-MCNC: 7.1 G/DL (ref 6–8.5)
SODIUM SERPL-SCNC: 140 MMOL/L (ref 136–145)

## 2021-04-05 PROCEDURE — 99214 OFFICE O/P EST MOD 30 MIN: CPT | Performed by: FAMILY MEDICINE

## 2021-04-05 PROCEDURE — 36415 COLL VENOUS BLD VENIPUNCTURE: CPT

## 2021-04-05 PROCEDURE — G0439 PPPS, SUBSEQ VISIT: HCPCS | Performed by: FAMILY MEDICINE

## 2021-04-05 PROCEDURE — 80053 COMPREHEN METABOLIC PANEL: CPT

## 2021-04-05 RX ORDER — ATORVASTATIN CALCIUM 40 MG/1
40 TABLET, FILM COATED ORAL
Qty: 90 TABLET | Refills: 3 | Status: SHIPPED | OUTPATIENT
Start: 2021-04-05 | End: 2021-06-25 | Stop reason: SDUPTHER

## 2021-04-05 RX ORDER — LISINOPRIL 40 MG/1
40 TABLET ORAL DAILY
Qty: 90 TABLET | Refills: 3 | Status: SHIPPED | OUTPATIENT
Start: 2021-04-05 | End: 2022-03-31 | Stop reason: SDUPTHER

## 2021-04-05 RX ORDER — AMLODIPINE BESYLATE 10 MG/1
10 TABLET ORAL DAILY
Qty: 90 TABLET | Refills: 3 | Status: SHIPPED | OUTPATIENT
Start: 2021-04-05 | End: 2022-03-31 | Stop reason: SDUPTHER

## 2021-04-05 RX ORDER — PAROXETINE HYDROCHLORIDE 20 MG/1
20 TABLET, FILM COATED ORAL DAILY
Qty: 90 TABLET | Refills: 3 | Status: SHIPPED | OUTPATIENT
Start: 2021-04-05 | End: 2022-03-31 | Stop reason: SDUPTHER

## 2021-04-05 RX ORDER — METOPROLOL SUCCINATE 50 MG/1
50 TABLET, EXTENDED RELEASE ORAL DAILY
Qty: 90 TABLET | Refills: 3 | Status: SHIPPED | OUTPATIENT
Start: 2021-04-05 | End: 2022-03-31 | Stop reason: SDUPTHER

## 2021-04-05 RX ORDER — TRAZODONE HYDROCHLORIDE 100 MG/1
TABLET ORAL
Qty: 90 TABLET | Refills: 3 | Status: SHIPPED | OUTPATIENT
Start: 2021-04-05 | End: 2022-11-08

## 2021-04-05 NOTE — PROGRESS NOTES
The ABCs of the Annual Wellness Visit  Subsequent Medicare Wellness Visit    Chief Complaint   Patient presents with   • Establish Care   • Hypertension       Subjective   History of Present Illness:  Clifford Abdul is a 75 y.o. male who presents for a Subsequent Medicare Wellness Visit.    HEALTH RISK ASSESSMENT    Recent Hospitalizations:  No hospitalization(s) within the last year.    Current Medical Providers:  Patient Care Team:  Saskia Camilo MD as PCP - General (Family Medicine)  Gene Javier APRN as Nurse Practitioner (Nurse Practitioner)    Smoking Status:  Social History     Tobacco Use   Smoking Status Never Smoker   Smokeless Tobacco Never Used       Alcohol Consumption:  Social History     Substance and Sexual Activity   Alcohol Use None       Depression Screen:   PHQ-2/PHQ-9 Depression Screening 4/5/2021   Little interest or pleasure in doing things 0   Feeling down, depressed, or hopeless 0   Trouble falling or staying asleep, or sleeping too much 0   Feeling tired or having little energy 0   Poor appetite or overeating 0   Feeling bad about yourself - or that you are a failure or have let yourself or your family down 0   Trouble concentrating on things, such as reading the newspaper or watching television 0   Moving or speaking so slowly that other people could have noticed. Or the opposite - being so fidgety or restless that you have been moving around a lot more than usual 0   Thoughts that you would be better off dead, or of hurting yourself in some way 0   Total Score 0   If you checked off any problems, how difficult have these problems made it for you to do your work, take care of things at home, or get along with other people? Not difficult at all       Fall Risk Screen:  STEADI Fall Risk Assessment was completed, and patient is at LOW risk for falls.Assessment completed on:4/5/2021    Health Habits and Functional and Cognitive Screening:  Functional & Cognitive Status 4/5/2021   Do  you have difficulty preparing food and eating? No   Do you have difficulty bathing yourself, getting dressed or grooming yourself? No   Do you have difficulty using the toilet? No   Do you have difficulty moving around from place to place? No   Do you have trouble with steps or getting out of a bed or a chair? No   Current Diet Well Balanced Diet   Dental Exam Up to date   Eye Exam Not up to date   Exercise (times per week) 3 times per week   Current Exercises Include -   Current Exercise Activities Include -   Do you need help using the phone?  No   Are you deaf or do you have serious difficulty hearing?  No   Do you need help with transportation? No   Do you need help shopping? No   Do you need help preparing meals?  No   Do you need help with housework?  No   Do you need help with laundry? No   Do you need help taking your medications? No   Do you need help managing money? No   Do you ever drive or ride in a car without wearing a seat belt? No   Have you felt unusual stress, anger or loneliness in the last month? No   Who do you live with? Spouse   If you need help, do you have trouble finding someone available to you? No   Have you been bothered in the last four weeks by sexual problems? No   Do you have difficulty concentrating, remembering or making decisions? No         Does the patient have evidence of cognitive impairment? No    Asprin use counseling:Taking ASA appropriately as indicated    Age-appropriate Screening Schedule:  Refer to the list below for future screening recommendations based on patient's age, sex and/or medical conditions. Orders for these recommended tests are listed in the plan section. The patient has been provided with a written plan.    Health Maintenance   Topic Date Due   • ZOSTER VACCINE (2 of 2) 06/09/2017   • LIPID PANEL  05/01/2021   • INFLUENZA VACCINE  08/01/2021   • TDAP/TD VACCINES (2 - Td) 10/14/2026   • COLONOSCOPY  10/29/2028          The following portions of the  "patient's history were reviewed and updated as appropriate: allergies, current medications, past family history, past medical history, past social history, past surgical history and problem list.    Outpatient Medications Prior to Visit   Medication Sig Dispense Refill   • amLODIPine (NORVASC) 10 MG tablet Take 1 tablet by mouth Daily. 90 tablet 0   • aspirin 81 MG EC tablet Take 81 mg by mouth Daily.     • atorvastatin (LIPITOR) 40 MG tablet Take 1 tablet by mouth every night at bedtime. 90 tablet 1   • lisinopril (PRINIVIL,ZESTRIL) 40 MG tablet Take 1 tablet by mouth Daily. 90 tablet 3   • metoprolol succinate XL (TOPROL-XL) 50 MG 24 hr tablet Take 1 tablet by mouth Daily. 90 tablet 3   • PARoxetine (PAXIL) 20 MG tablet Take 1 tablet by mouth Daily. 90 tablet 0   • traZODone (DESYREL) 100 MG tablet TAKE ONE-HALF TO ONE TABLET BY MOUTH EVERY NIGHT AT BEDTIME FOR SLEEP 90 tablet 0     No facility-administered medications prior to visit.       Patient Active Problem List   Diagnosis   • Essential hypertension   • Chronic kidney disease, stage III (moderate) (CMS/HCC)   • Central retinal artery occlusion of right eye   • Hypermetropia   • Pure hypercholesterolemia   • Depression       Advanced Care Planning:  ACP discussion was held with the patient during this visit. Patient does not have an advance directive, information provided.  Patient reports having paperwork at home, not completed yet.    Compared to one year ago, the patient feels his physical health is the same.  Compared to one year ago, the patient feels his mental health is the same.    Reviewed chart for potential of high risk medication in the elderly: yes  Reviewed chart for potential of harmful drug interactions in the elderly:yes    Objective         Vitals:    04/05/21 0815   BP: 142/90   Pulse: (!) 48   SpO2: 98%   Weight: 72.6 kg (160 lb)   Height: 175.3 cm (69\")   PainSc: 0-No pain       Body mass index is 23.63 kg/m².  Discussed the patient's " BMI with him. The BMI is in the acceptable range.      Assessment/Plan   Medicare Risks and Personalized Health Plan  CMS Preventative Services Quick Reference  Advance Directive Discussion  Colon Cancer Screening  Dementia/Memory   Depression/Dysphoria    The above risks/problems have been discussed with the patient.  Pertinent information has been shared with the patient in the After Visit Summary.  Follow up plans and orders are seen below in the Assessment/Plan Section.    Diagnoses and all orders for this visit:    1. Medicare annual wellness visit, subsequent (Primary)    2. Stage 3a chronic kidney disease (CMS/[facility])  -     Comprehensive Metabolic Panel; Future    3. Essential hypertension  -     amLODIPine (NORVASC) 10 MG tablet; Take 1 tablet by mouth Daily.  Dispense: 90 tablet; Refill: 3  -     lisinopril (PRINIVIL,ZESTRIL) 40 MG tablet; Take 1 tablet by mouth Daily.  Dispense: 90 tablet; Refill: 3  -     metoprolol succinate XL (TOPROL-XL) 50 MG 24 hr tablet; Take 1 tablet by mouth Daily.  Dispense: 90 tablet; Refill: 3    4. Pure hypercholesterolemia  -     atorvastatin (LIPITOR) 40 MG tablet; Take 1 tablet by mouth every night at bedtime.  Dispense: 90 tablet; Refill: 3    5. Major depressive disorder with single episode, in partial remission (CMS/[facility])  -     PARoxetine (PAXIL) 20 MG tablet; Take 1 tablet by mouth Daily.  Dispense: 90 tablet; Refill: 3  -     traZODone (DESYREL) 100 MG tablet; TAKE ONE-HALF TO ONE TABLET BY MOUTH EVERY NIGHT AT BEDTIME FOR SLEEP  Dispense: 90 tablet; Refill: 3       Follow Up:  Return in about 6 months (around 10/5/2021) for Recheck.     An After Visit Summary and PPPS were given to the patient.           This document has been electronically signed by Saskia Camilo MD

## 2021-04-05 NOTE — PATIENT INSTRUCTIONS
Advance Directive      Advance directives are legal documents that let you make choices ahead of time about your health care and medical treatment in case you become unable to communicate for yourself. Advance directives are a way for you to make known your wishes to family, friends, and health care providers. This can let others know about your end-of-life care if you become unable to communicate.  Discussing and writing advance directives should happen over time rather than all at once. Advance directives can be changed depending on your situation and what you want, even after you have signed the advance directives.  There are different types of advance directives, such as:  · Medical power of .  · Living will.  · Do not resuscitate (DNR) or do not attempt resuscitation (DNAR) order.  Health care proxy and medical power of   A health care proxy is also called a health care agent. This is a person who is appointed to make medical decisions for you in cases where you are unable to make the decisions yourself. Generally, people choose someone they know well and trust to represent their preferences. Make sure to ask this person for an agreement to act as your proxy. A proxy may have to exercise judgment in the event of a medical decision for which your wishes are not known.  A medical power of  is a legal document that names your health care proxy. Depending on the laws in your state, after the document is written, it may also need to be:  · Signed.  · Notarized.  · Dated.  · Copied.  · Witnessed.  · Incorporated into your medical record.  You may also want to appoint someone to manage your money in a situation in which you are unable to do so. This is called a durable power of  for finances. It is a separate legal document from the durable power of  for health care. You may choose the same person or someone different from your health care proxy to act as your agent in money  matters.  If you do not appoint a proxy, or if there is a concern that the proxy is not acting in your best interests, a court may appoint a guardian to act on your behalf.  Living will  A living will is a set of instructions that state your wishes about medical care when you cannot express them yourself. Health care providers should keep a copy of your living will in your medical record. You may want to give a copy to family members or friends. To alert caregivers in case of an emergency, you can place a card in your wallet to let them know that you have a living will and where they can find it. A living will is used if you become:  · Terminally ill.  · Disabled.  · Unable to communicate or make decisions.  Items to consider in your living will include:  · To use or not to use life-support equipment, such as dialysis machines and breathing machines (ventilators).  · A DNR or DNAR order. This tells health care providers not to use cardiopulmonary resuscitation (CPR) if breathing or heartbeat stops.  · To use or not to use tube feeding.  · To be given or not to be given food and fluids.  · Comfort (palliative) care when the goal becomes comfort rather than a cure.  · Donation of organs and tissues.  A living will does not give instructions for distributing your money and property if you should pass away.  DNR or DNAR  A DNR or DNAR order is a request not to have CPR in the event that your heart stops beating or you stop breathing. If a DNR or DNAR order has not been made and shared, a health care provider will try to help any patient whose heart has stopped or who has stopped breathing. If you plan to have surgery, talk with your health care provider about how your DNR or DNAR order will be followed if problems occur.  What if I do not have an advance directive?  If you do not have an advance directive, some states assign family decision makers to act on your behalf based on how closely you are related to them. Each  state has its own laws about advance directives. You may want to check with your health care provider, , or state representative about the laws in your state.  Summary  · Advance directives are the legal documents that allow you to make choices ahead of time about your health care and medical treatment in case you become unable to tell others about your care.  · The process of discussing and writing advance directives should happen over time. You can change the advance directives, even after you have signed them.  · Advance directives include DNR or DNAR orders, living justin, and designating an agent as your medical power of .  This information is not intended to replace advice given to you by your health care provider. Make sure you discuss any questions you have with your health care provider.  Document Revised: 01/28/2021 Document Reviewed: 07/16/2020  Elsevier Patient Education © 2021 [employer]

## 2021-04-05 NOTE — PROGRESS NOTES
"Chief Complaint  Establish Care and Hypertension    Subjective          Clifford Abdul presents to [facility]  History of Present Illness    Patient presents today to establish care.  He has chronic medical problems including hypertension, hyperlipidemia, depression and chronic kidney disease.  He notes that he is stable on current medications.  Checks blood pressure at home, and it is normal.  Sometimes elevated at office visits.  Patient is taking amlodipine 10 mg daily, lisinopril 40 mg daily metoprolol XL 50 mg daily.  Patient always has low/low normal heart rate, and this is genetic.  He does not have any symptoms of bradycardia.  Patient feels like he is doing okay as far as depression goes.  He has been cutting his Paxil tablet in half.  He is not sure whether or not he needs this medication anymore.  He is taking Lipitor 40 mg daily for stroke prevention.  Also taking a baby aspirin.  Kidney function has been stable historically.  Patient has followed with nephrology in the past.  Does not have routine follow-up with this provider at this time.    Objective   Vital Signs:   /90   Pulse (!) 48   Ht 175.3 cm (69\")   Wt 72.6 kg (160 lb)   SpO2 98%   BMI 23.63 kg/m²     Physical Exam  Vitals reviewed.   Constitutional:       General: He is not in acute distress.     Appearance: He is well-developed.   HENT:      Head: Normocephalic and atraumatic.   Cardiovascular:      Rate and Rhythm: Regular rhythm. Bradycardia present.      Heart sounds: No murmur heard.     Pulmonary:      Effort: Pulmonary effort is normal. No respiratory distress.      Breath sounds: Normal breath sounds. No wheezing.   Abdominal:      Palpations: Abdomen is soft.      Tenderness: There is no abdominal tenderness.   Musculoskeletal:      Cervical back: Neck supple.   Skin:     General: Skin is warm and dry.      Findings: No rash.   Neurological:      Mental Status: He is alert and oriented to " person, place, and time.        Result Review :   The following data was reviewed by: Saskia Camilo MD on 04/05/2021:  Common labs    Common Labsle 5/1/20 5/1/20 8/17/20 8/17/20    0825 0825 0820 0820   Glucose 91   94   BUN 13   13   Creatinine 1.43 (A)   1.43 (A)   eGFR Non  Am 49 (A)   48 (A)   Sodium 139   141   Potassium 4.2   4.8   Chloride 101   103   Calcium 9.4   10.2   Albumin 4.00   4.40   Total Bilirubin 0.8      Alkaline Phosphatase 137 (A)      AST (SGOT) 25      ALT (SGPT) 23      Hemoglobin   16.2    Hematocrit   47.0    Total Cholesterol  112     Triglycerides  80     HDL Cholesterol  64 (A)     LDL Cholesterol   32     (A) Abnormal value                      Assessment and Plan    Diagnoses and all orders for this visit:    1. Medicare annual wellness visit, subsequent (Primary)    2. Stage 3a chronic kidney disease (CMS/[facility])  -     Comprehensive Metabolic Panel; Future    3. Essential hypertension  -     amLODIPine (NORVASC) 10 MG tablet; Take 1 tablet by mouth Daily.  Dispense: 90 tablet; Refill: 3  -     lisinopril (PRINIVIL,ZESTRIL) 40 MG tablet; Take 1 tablet by mouth Daily.  Dispense: 90 tablet; Refill: 3  -     metoprolol succinate XL (TOPROL-XL) 50 MG 24 hr tablet; Take 1 tablet by mouth Daily.  Dispense: 90 tablet; Refill: 3    4. Pure hypercholesterolemia  -     atorvastatin (LIPITOR) 40 MG tablet; Take 1 tablet by mouth every night at bedtime.  Dispense: 90 tablet; Refill: 3    5. Major depressive disorder with single episode, in partial remission (CMS/[facility])  -     PARoxetine (PAXIL) 20 MG tablet; Take 1 tablet by mouth Daily.  Dispense: 90 tablet; Refill: 3  -     traZODone (DESYREL) 100 MG tablet; TAKE ONE-HALF TO ONE TABLET BY MOUTH EVERY NIGHT AT BEDTIME FOR SLEEP  Dispense: 90 tablet; Refill: 3      Patient presents today to establish care.  Blood pressure slightly elevated, however patient reports normal when checking at home.  Continue current antihypertensive  medications.  Patient should monitor heart rate closely.  If persistent low, would plan to discontinue metoprolol and make adjustments to patient's medication regimen accordingly.  Patient should continue Lipitor 40 mg daily.  Last lipid panel was good.  Patient reports stable chronic kidney disease, will recheck CMP today.  Patient has seen nephrology in the past.  Since patient feels that he is doing okay on lower dose of Paxil, advised that patient try taking Paxil 10 mg every other day to try and wean off of this medication if it is not needed.  Okay to continue physical activity as tolerated.  Encouraged healthy diet.      Follow Up   Return in about 6 months (around 10/5/2021) for Recheck.  Patient was given instructions and counseling regarding his condition or for health maintenance advice. Please see specific information pulled into the AVS if appropriate.           This document has been electronically signed by Saskia Camilo MD

## 2021-04-19 ENCOUNTER — TELEPHONE (OUTPATIENT)
Dept: FAMILY MEDICINE CLINIC | Facility: CLINIC | Age: 76
End: 2021-04-19

## 2021-04-19 NOTE — TELEPHONE ENCOUNTER
Please call patient with the following labs: CMP shows stable kidney function.  Alkaline phosphatase is a little bit elevated from previous labs.  We will plan to recheck with next labs in 6 months.  If elevation is persistent or higher, additional work-up will be needed at that time.  ThanksNiya

## 2021-04-20 NOTE — TELEPHONE ENCOUNTER
Per Dr. Camilo, Mr. Abdul has been called with recent test results and recommendations.  Continue current medications and follow-upas planned or sooner if any problems.

## 2021-06-25 DIAGNOSIS — E78.00 PURE HYPERCHOLESTEROLEMIA: ICD-10-CM

## 2021-06-25 RX ORDER — ATORVASTATIN CALCIUM 40 MG/1
40 TABLET, FILM COATED ORAL
Qty: 90 TABLET | Refills: 3 | Status: SHIPPED | OUTPATIENT
Start: 2021-06-25 | End: 2021-07-06

## 2021-07-05 DIAGNOSIS — E78.00 PURE HYPERCHOLESTEROLEMIA: ICD-10-CM

## 2021-07-06 RX ORDER — ATORVASTATIN CALCIUM 40 MG/1
TABLET, FILM COATED ORAL
Qty: 21 TABLET | Refills: 0 | Status: SHIPPED | OUTPATIENT
Start: 2021-07-06 | End: 2022-07-07 | Stop reason: SDUPTHER

## 2021-09-18 ENCOUNTER — APPOINTMENT (OUTPATIENT)
Dept: GENERAL RADIOLOGY | Facility: HOSPITAL | Age: 76
End: 2021-09-18

## 2021-09-18 ENCOUNTER — HOSPITAL ENCOUNTER (EMERGENCY)
Facility: HOSPITAL | Age: 76
Discharge: HOME | End: 2021-09-18
Attending: EMERGENCY MEDICINE | Admitting: EMERGENCY MEDICINE

## 2021-09-18 VITALS
OXYGEN SATURATION: 96 % | DIASTOLIC BLOOD PRESSURE: 98 MMHG | WEIGHT: 155 LBS | SYSTOLIC BLOOD PRESSURE: 180 MMHG | RESPIRATION RATE: 18 BRPM | TEMPERATURE: 98.7 F | HEIGHT: 69 IN | BODY MASS INDEX: 22.96 KG/M2 | HEART RATE: 80 BPM

## 2021-09-18 DIAGNOSIS — I10 UNCONTROLLED HYPERTENSION: ICD-10-CM

## 2021-09-18 DIAGNOSIS — J40 BRONCHITIS: Primary | ICD-10-CM

## 2021-09-18 LAB
ANION GAP SERPL CALCULATED.3IONS-SCNC: 12 MMOL/L (ref 5–15)
BASOPHILS # BLD AUTO: 0.08 10*3/MM3 (ref 0–0.2)
BASOPHILS NFR BLD AUTO: 0.7 % (ref 0–1.5)
BUN SERPL-MCNC: 12 MG/DL (ref 8–23)
BUN/CREAT SERPL: 9.2 (ref 7–25)
CALCIUM SPEC-SCNC: 9.4 MG/DL (ref 8.6–10.5)
CHLORIDE SERPL-SCNC: 97 MMOL/L (ref 98–107)
CO2 SERPL-SCNC: 23 MMOL/L (ref 22–29)
CREAT SERPL-MCNC: 1.3 MG/DL (ref 0.76–1.27)
DEPRECATED RDW RBC AUTO: 39.6 FL (ref 37–54)
EOSINOPHIL # BLD AUTO: 0.11 10*3/MM3 (ref 0–0.4)
EOSINOPHIL NFR BLD AUTO: 1 % (ref 0.3–6.2)
ERYTHROCYTE [DISTWIDTH] IN BLOOD BY AUTOMATED COUNT: 12.7 % (ref 12.3–15.4)
FLUAV RNA RESP QL NAA+PROBE: NOT DETECTED
FLUBV RNA RESP QL NAA+PROBE: NOT DETECTED
GFR SERPL CREATININE-BSD FRML MDRD: 54 ML/MIN/1.73
GLUCOSE SERPL-MCNC: 127 MG/DL (ref 65–99)
HCT VFR BLD AUTO: 45 % (ref 37.5–51)
HGB BLD-MCNC: 15.7 G/DL (ref 13–17.7)
HOLD SPECIMEN: NORMAL
IMM GRANULOCYTES # BLD AUTO: 0.05 10*3/MM3 (ref 0–0.05)
IMM GRANULOCYTES NFR BLD AUTO: 0.4 % (ref 0–0.5)
LYMPHOCYTES # BLD AUTO: 1.51 10*3/MM3 (ref 0.7–3.1)
LYMPHOCYTES NFR BLD AUTO: 13.2 % (ref 19.6–45.3)
MCH RBC QN AUTO: 29.9 PG (ref 26.6–33)
MCHC RBC AUTO-ENTMCNC: 34.9 G/DL (ref 31.5–35.7)
MCV RBC AUTO: 85.7 FL (ref 79–97)
MONOCYTES # BLD AUTO: 0.73 10*3/MM3 (ref 0.1–0.9)
MONOCYTES NFR BLD AUTO: 6.4 % (ref 5–12)
NEUTROPHILS NFR BLD AUTO: 78.3 % (ref 42.7–76)
NEUTROPHILS NFR BLD AUTO: 9 10*3/MM3 (ref 1.7–7)
NRBC BLD AUTO-RTO: 0 /100 WBC (ref 0–0.2)
PLATELET # BLD AUTO: 199 10*3/MM3 (ref 140–450)
PMV BLD AUTO: 10.8 FL (ref 6–12)
POTASSIUM SERPL-SCNC: 4.1 MMOL/L (ref 3.5–5.2)
RBC # BLD AUTO: 5.25 10*6/MM3 (ref 4.14–5.8)
SARS-COV-2 RNA RESP QL NAA+PROBE: NOT DETECTED
SODIUM SERPL-SCNC: 132 MMOL/L (ref 136–145)
WBC # BLD AUTO: 11.48 10*3/MM3 (ref 3.4–10.8)

## 2021-09-18 PROCEDURE — 87636 SARSCOV2 & INF A&B AMP PRB: CPT

## 2021-09-18 PROCEDURE — 85025 COMPLETE CBC W/AUTO DIFF WBC: CPT | Performed by: EMERGENCY MEDICINE

## 2021-09-18 PROCEDURE — 71046 X-RAY EXAM CHEST 2 VIEWS: CPT

## 2021-09-18 PROCEDURE — 80048 BASIC METABOLIC PNL TOTAL CA: CPT | Performed by: EMERGENCY MEDICINE

## 2021-09-18 PROCEDURE — 99283 EMERGENCY DEPT VISIT LOW MDM: CPT

## 2021-09-18 RX ORDER — DEXTROMETHORPHAN HYDROBROMIDE AND PROMETHAZINE HYDROCHLORIDE 15; 6.25 MG/5ML; MG/5ML
5 SYRUP ORAL 4 TIMES DAILY PRN
Qty: 118 ML | Refills: 0 | Status: SHIPPED | OUTPATIENT
Start: 2021-09-18 | End: 2021-10-11

## 2021-09-18 RX ORDER — SODIUM CHLORIDE 0.9 % (FLUSH) 0.9 %
10 SYRINGE (ML) INJECTION AS NEEDED
Status: DISCONTINUED | OUTPATIENT
Start: 2021-09-18 | End: 2021-09-18 | Stop reason: HOSPADM

## 2021-09-18 RX ORDER — ALBUTEROL SULFATE 90 UG/1
2 AEROSOL, METERED RESPIRATORY (INHALATION) EVERY 4 HOURS PRN
Qty: 6.7 G | Refills: 0 | Status: SHIPPED | OUTPATIENT
Start: 2021-09-18 | End: 2022-08-11

## 2021-09-18 NOTE — ED PROVIDER NOTES
"Subjective   77yo male pmh significant htn/hyperlipidemia presents ED c/o 1wk hx productive cough \"phlegm\"/congestion/fatigue.  ROS neg sinus pressure/fever/chills/sore throat/chest pain/soa/abd pain/nausea/vomiting.      History provided by:  Patient  URI  Presenting symptoms: congestion, cough and fatigue    Severity:  Moderate  Duration:  1 week      Review of Systems   Constitutional: Positive for fatigue.   HENT: Positive for congestion.    Respiratory: Positive for cough. Negative for shortness of breath.    Cardiovascular: Negative.    Gastrointestinal: Negative.    Genitourinary: Negative.    Musculoskeletal: Negative.    Allergic/Immunologic: Negative for immunocompromised state.   All other systems reviewed and are negative.      Past Medical History:   Diagnosis Date   • Acute bronchitis    • Chronic kidney disease, stage III (moderate) (CMS/HCC)    • Essential hypertension    • Hypertensive disorder     with white coat HTN   • Influenza vaccine needed    • Need for prophylactic vaccination against Streptococcus pneumoniae (pneumococcus)        No Known Allergies    Past Surgical History:   Procedure Laterality Date   • COLONOSCOPY  09/06/2013    [facility]       Family History   Problem Relation Age of Onset   • Cancer Father 70        lung   • Hypertension Maternal Grandmother    • Diabetes Neg Hx        Social History     Socioeconomic History   • Marital status:      Spouse name: Not on file   • Number of children: Not on file   • Years of education: Not on file   • Highest education level: Not on file   Tobacco Use   • Smoking status: Never Smoker   • Smokeless tobacco: Never Used   Substance and Sexual Activity   • Alcohol use: Not Currently   • Drug use: Never   • Sexual activity: Defer           Objective   Physical Exam  Vitals and nursing note reviewed.   Constitutional:       Appearance: Normal appearance.   HENT:      Head: Normocephalic and atraumatic.      Right " Ear: Tympanic membrane, ear canal and external ear normal.      Left Ear: Tympanic membrane, ear canal and external ear normal.      Nose: Nose normal. No rhinorrhea.      Mouth/Throat:      Mouth: Mucous membranes are moist.   Eyes:      Pupils: Pupils are equal, round, and reactive to light.   Cardiovascular:      Rate and Rhythm: Normal rate and regular rhythm.      Pulses: Normal pulses.      Heart sounds: Normal heart sounds. No murmur heard.   No friction rub. No gallop.    Pulmonary:      Effort: Pulmonary effort is normal. No respiratory distress.      Breath sounds: Normal breath sounds. No wheezing, rhonchi or rales.   Abdominal:      General: Abdomen is flat. Bowel sounds are normal. There is no distension.      Palpations: Abdomen is soft.      Tenderness: There is no abdominal tenderness. There is no guarding or rebound.   Musculoskeletal:         General: No swelling or deformity.      Cervical back: Normal range of motion and neck supple. No rigidity.      Right lower leg: No edema.      Left lower leg: No edema.   Lymphadenopathy:      Cervical: No cervical adenopathy.   Skin:     General: Skin is warm and dry.   Neurological:      General: No focal deficit present.      Mental Status: He is alert and oriented to person, place, and time.      GCS: GCS eye subscore is 4. GCS verbal subscore is 5. GCS motor subscore is 6.         Procedures           ED Course      Labs Reviewed   BASIC METABOLIC PANEL - Abnormal; Notable for the following components:       Result Value    Glucose 127 (*)     Creatinine 1.30 (*)     Sodium 132 (*)     Chloride 97 (*)     eGFR Non  Amer 54 (*)     All other components within normal limits    Narrative:     GFR Normal >60  Chronic Kidney Disease <60  Kidney Failure <15     CBC WITH AUTO DIFFERENTIAL - Abnormal; Notable for the following components:    WBC 11.48 (*)     Neutrophil % 78.3 (*)     Lymphocyte % 13.2 (*)     Neutrophils, Absolute 9.00 (*)     All other  components within normal limits   COVID-19 AND FLU A/B, NP SWAB IN TRANSPORT MEDIA 8-12 HR TAT - Normal    Narrative:     Fact sheet for providers: https://www.fda.gov/media/951344/download    Fact sheet for patients: https://www.fda.gov/media/119131/download    Test performed by PCR.   CBC AND DIFFERENTIAL    Narrative:     The following orders were created for panel order CBC & Differential.  Procedure                               Abnormality         Status                     ---------                               -----------         ------                     CBC Auto Differential[[id number]]        Abnormal            Final result                 Please view results for these tests on the individual orders.   EXTRA TUBES    Narrative:     The following orders were created for panel order Extra Tubes.  Procedure                               Abnormality         Status                     ---------                               -----------         ------                     Gold Top - SST[[id number]]                                   Final result                 Please view results for these tests on the individual orders.   GOLD TOP - SST     XR Chest 2 View    Result Date: 9/18/2021  Narrative: TWO VIEW CHEST HISTORY: Cough Frontal and lateral films of the chest were obtained. COMPARISON: December 29, 2016 FINDINGS:  The lungs are clear of an acute process. The heart is not enlarged. The pulmonary vasculature is not increased. No pleural effusion. No pneumothorax. No acute osseous abnormality. Degenerative disc disease lumbar spine.     Impression: CONCLUSION: No Acute Disease [id number] Electronically signed by:  Jossue Piper MD  9/18/2021 7:00 PM CDT Workstation: [id number]                                             Final diagnoses:   Bronchitis   Uncontrolled hypertension       ED Disposition  ED Disposition     ED Disposition Condition Comment    Discharge Saskia Hopkins MD  [address] [place] [address]  [address]  [facility] [address]  [place] [id number] [phone]    In 2 days           Medication List      New Prescriptions    albuterol sulfate  (90 Base) MCG/ACT inhaler  Commonly known as: PROVENTIL HFA;VENTOLIN HFA;PROAIR HFA  Inhale 2 puffs Every 4 (Four) Hours As Needed for Wheezing or Shortness of Air.     promethazine-dextromethorphan 6.25-15 MG/5ML syrup  Commonly known as: PROMETHAZINE-DM  Take 5 mL by mouth 4 (Four) Times a Day As Needed for Cough.           Where to Get Your Medications      These medications were sent to [facility] [address] [place] [address] - [address] [place] [address] TAMIKA RAMIREZ AT [facility] - [phone]  - [phone] [address] TAMIKA RAMIREZ, [place] [id number]    Phone: [phone]   · albuterol sulfate  (90 Base) MCG/ACT inhaler  · promethazine-dextromethorphan 6.25-15 MG/5ML syrup          Emeterio Mendiola MD  09/18/21 2121       Emeterio Mendiola MD  09/18/21 2121

## 2021-09-19 NOTE — DISCHARGE INSTRUCTIONS
Return ED fever, shortness of air, vomiting, dehydration, headache, chest pain, worse condition, any other concerns

## 2021-10-11 ENCOUNTER — OFFICE VISIT (OUTPATIENT)
Dept: FAMILY MEDICINE CLINIC | Facility: CLINIC | Age: 76
End: 2021-10-11

## 2021-10-11 VITALS
HEIGHT: 69 IN | BODY MASS INDEX: 22.81 KG/M2 | OXYGEN SATURATION: 100 % | SYSTOLIC BLOOD PRESSURE: 118 MMHG | DIASTOLIC BLOOD PRESSURE: 80 MMHG | HEART RATE: 54 BPM | WEIGHT: 154 LBS

## 2021-10-11 DIAGNOSIS — F32.4 MAJOR DEPRESSIVE DISORDER WITH SINGLE EPISODE, IN PARTIAL REMISSION (HCC): ICD-10-CM

## 2021-10-11 DIAGNOSIS — E87.1 HYPONATREMIA: ICD-10-CM

## 2021-10-11 DIAGNOSIS — I10 ESSENTIAL HYPERTENSION: Primary | ICD-10-CM

## 2021-10-11 DIAGNOSIS — E78.00 PURE HYPERCHOLESTEROLEMIA: ICD-10-CM

## 2021-10-11 DIAGNOSIS — N18.31 STAGE 3A CHRONIC KIDNEY DISEASE (HCC): ICD-10-CM

## 2021-10-11 PROCEDURE — 99214 OFFICE O/P EST MOD 30 MIN: CPT | Performed by: FAMILY MEDICINE

## 2021-12-14 ENCOUNTER — LAB (OUTPATIENT)
Dept: LAB | Facility: HOSPITAL | Age: 76
End: 2021-12-14

## 2021-12-14 ENCOUNTER — TELEPHONE (OUTPATIENT)
Dept: FAMILY MEDICINE CLINIC | Facility: CLINIC | Age: 76
End: 2021-12-14

## 2021-12-14 DIAGNOSIS — I10 ESSENTIAL HYPERTENSION: ICD-10-CM

## 2021-12-14 DIAGNOSIS — E78.00 PURE HYPERCHOLESTEROLEMIA: ICD-10-CM

## 2021-12-14 DIAGNOSIS — E87.1 HYPONATREMIA: ICD-10-CM

## 2021-12-14 LAB
ANION GAP SERPL CALCULATED.3IONS-SCNC: 7.8 MMOL/L (ref 5–15)
BUN SERPL-MCNC: 17 MG/DL (ref 8–23)
BUN/CREAT SERPL: 13.1 (ref 7–25)
CALCIUM SPEC-SCNC: 9.4 MG/DL (ref 8.6–10.5)
CHLORIDE SERPL-SCNC: 102 MMOL/L (ref 98–107)
CHOLEST SERPL-MCNC: 113 MG/DL (ref 0–200)
CO2 SERPL-SCNC: 30.2 MMOL/L (ref 22–29)
CREAT SERPL-MCNC: 1.3 MG/DL (ref 0.76–1.27)
GFR SERPL CREATININE-BSD FRML MDRD: 54 ML/MIN/1.73
GLUCOSE SERPL-MCNC: 86 MG/DL (ref 65–99)
HDLC SERPL-MCNC: 66 MG/DL (ref 40–60)
LDLC SERPL CALC-MCNC: 32 MG/DL (ref 0–100)
LDLC/HDLC SERPL: 0.49 {RATIO}
POTASSIUM SERPL-SCNC: 4.1 MMOL/L (ref 3.5–5.2)
SODIUM SERPL-SCNC: 140 MMOL/L (ref 136–145)
TRIGL SERPL-MCNC: 73 MG/DL (ref 0–150)
VLDLC SERPL-MCNC: 15 MG/DL (ref 5–40)

## 2021-12-14 PROCEDURE — 80061 LIPID PANEL: CPT

## 2021-12-14 PROCEDURE — 80048 BASIC METABOLIC PNL TOTAL CA: CPT

## 2021-12-14 PROCEDURE — 36415 COLL VENOUS BLD VENIPUNCTURE: CPT

## 2021-12-15 NOTE — TELEPHONE ENCOUNTER
Per Dr. Camilo, Mr. Abdul has been called with recent lab results and recommendations.   Continue current medications and follow-up as planned or sooner if any problems.

## 2021-12-15 NOTE — TELEPHONE ENCOUNTER
Please let patient know kidney function is stable, sodium is normal and cholesterol is good.  ThanksGASTON

## 2022-03-31 DIAGNOSIS — F32.4 MAJOR DEPRESSIVE DISORDER WITH SINGLE EPISODE, IN PARTIAL REMISSION: ICD-10-CM

## 2022-03-31 DIAGNOSIS — I10 ESSENTIAL HYPERTENSION: ICD-10-CM

## 2022-03-31 RX ORDER — AMLODIPINE BESYLATE 10 MG/1
10 TABLET ORAL DAILY
Qty: 90 TABLET | Refills: 5 | Status: SHIPPED | OUTPATIENT
Start: 2022-03-31 | End: 2023-02-28 | Stop reason: SDUPTHER

## 2022-03-31 RX ORDER — METOPROLOL SUCCINATE 50 MG/1
50 TABLET, EXTENDED RELEASE ORAL DAILY
Qty: 90 TABLET | Refills: 5 | Status: SHIPPED | OUTPATIENT
Start: 2022-03-31 | End: 2023-02-28 | Stop reason: SDUPTHER

## 2022-03-31 RX ORDER — LISINOPRIL 40 MG/1
40 TABLET ORAL DAILY
Qty: 90 TABLET | Refills: 5 | Status: SHIPPED | OUTPATIENT
Start: 2022-03-31 | End: 2023-02-28 | Stop reason: SDUPTHER

## 2022-03-31 RX ORDER — PAROXETINE HYDROCHLORIDE 20 MG/1
20 TABLET, FILM COATED ORAL DAILY
Qty: 90 TABLET | Refills: 5 | Status: SHIPPED | OUTPATIENT
Start: 2022-03-31 | End: 2023-02-28 | Stop reason: SDUPTHER

## 2022-04-11 ENCOUNTER — LAB (OUTPATIENT)
Dept: LAB | Facility: HOSPITAL | Age: 77
End: 2022-04-11

## 2022-04-11 ENCOUNTER — OFFICE VISIT (OUTPATIENT)
Dept: FAMILY MEDICINE CLINIC | Facility: CLINIC | Age: 77
End: 2022-04-11

## 2022-04-11 VITALS
HEART RATE: 48 BPM | BODY MASS INDEX: 23.11 KG/M2 | SYSTOLIC BLOOD PRESSURE: 120 MMHG | WEIGHT: 156 LBS | HEIGHT: 69 IN | DIASTOLIC BLOOD PRESSURE: 76 MMHG | OXYGEN SATURATION: 95 %

## 2022-04-11 DIAGNOSIS — N18.31 STAGE 3A CHRONIC KIDNEY DISEASE: ICD-10-CM

## 2022-04-11 DIAGNOSIS — Z00.00 MEDICARE ANNUAL WELLNESS VISIT, SUBSEQUENT: Primary | ICD-10-CM

## 2022-04-11 LAB
ANION GAP SERPL CALCULATED.3IONS-SCNC: 10.8 MMOL/L (ref 5–15)
BUN SERPL-MCNC: 14 MG/DL (ref 8–23)
BUN/CREAT SERPL: 8.7 (ref 7–25)
CALCIUM SPEC-SCNC: 9.6 MG/DL (ref 8.6–10.5)
CHLORIDE SERPL-SCNC: 104 MMOL/L (ref 98–107)
CO2 SERPL-SCNC: 25.2 MMOL/L (ref 22–29)
CREAT SERPL-MCNC: 1.61 MG/DL (ref 0.76–1.27)
EGFRCR SERPLBLD CKD-EPI 2021: 44.6 ML/MIN/1.73
GLUCOSE SERPL-MCNC: 92 MG/DL (ref 65–99)
POTASSIUM SERPL-SCNC: 4.4 MMOL/L (ref 3.5–5.2)
SODIUM SERPL-SCNC: 140 MMOL/L (ref 136–145)

## 2022-04-11 PROCEDURE — 36415 COLL VENOUS BLD VENIPUNCTURE: CPT

## 2022-04-11 PROCEDURE — G0439 PPPS, SUBSEQ VISIT: HCPCS | Performed by: FAMILY MEDICINE

## 2022-04-11 PROCEDURE — 1126F AMNT PAIN NOTED NONE PRSNT: CPT | Performed by: FAMILY MEDICINE

## 2022-04-11 PROCEDURE — 1170F FXNL STATUS ASSESSED: CPT | Performed by: FAMILY MEDICINE

## 2022-04-11 PROCEDURE — 1160F RVW MEDS BY RX/DR IN RCRD: CPT | Performed by: FAMILY MEDICINE

## 2022-04-11 PROCEDURE — 80048 BASIC METABOLIC PNL TOTAL CA: CPT

## 2022-04-11 NOTE — PATIENT INSTRUCTIONS
Medicare Wellness  Personal Prevention Plan of Service     Date of Office Visit:    Encounter Provider:  Saskia Camilo MD  Place of Service:  [facility]  Patient Name: Clifford Abdul  :  1945    As part of the Medicare Wellness portion of your visit today, we are providing you with this personalized preventive plan of services (PPPS). This plan is based upon recommendations of the [place] Preventive Services Task Force (USPSTF) and the Advisory Committee on Immunization Practices (ACIP).    This lists the preventive care services that should be considered, and provides dates of when you are due. Items listed as completed are up-to-date and do not require any further intervention.    Health Maintenance   Topic Date Due    ZOSTER VACCINE (2 of 2) 2022 (Originally 2017)    INFLUENZA VACCINE  2022    LIPID PANEL  2022    ANNUAL WELLNESS VISIT  2023    TDAP/TD VACCINES (2 - Td or Tdap) 10/14/2026    COLORECTAL CANCER SCREENING  10/29/2028    HEPATITIS C SCREENING  Completed    COVID-19 Vaccine  Completed    Pneumococcal Vaccine 65+  Completed    Hepatitis B  Aged Out       Orders Placed This Encounter   Procedures    Basic metabolic panel     Standing Status:   Future     Standing Expiration Date:   2023     Order Specific Question:   Release to patient     Answer:   Immediate       Return in about 4 months (around 2022) for Recheck.

## 2022-04-11 NOTE — PROGRESS NOTES
The ABCs of the Annual Wellness Visit  Subsequent Medicare Wellness Visit    Chief Complaint   Patient presents with   • Medicare Wellness-subsequent     Subjective    History of Present Illness:  Clifford Abdul is a 76 y.o. male who presents for a Subsequent Medicare Wellness Visit.    The following portions of the patient's history were reviewed and   updated as appropriate: allergies, current medications, past family history, past medical history, past social history, past surgical history and problem list.    Compared to one year ago, the patient feels his physical   health is the same.    Compared to one year ago, the patient feels his mental   health is the same.    Recent Hospitalizations:  He was not admitted to the hospital during the last year.       Current Medical Providers:  Patient Care Team:  Saskia Camilo MD as PCP - General (Family Medicine)  Gene Javier APRN as Nurse Practitioner (Nurse Practitioner)    Outpatient Medications Prior to Visit   Medication Sig Dispense Refill   • amLODIPine (NORVASC) 10 MG tablet Take 1 tablet by mouth Daily. 90 tablet 5   • aspirin 81 MG EC tablet Take 81 mg by mouth Daily.     • atorvastatin (LIPITOR) 40 MG tablet TAKE ONE TABLET BY MOUTH EVERY NIGHT AT BEDTIME 21 tablet 0   • lisinopril (PRINIVIL,ZESTRIL) 40 MG tablet Take 1 tablet by mouth Daily. 90 tablet 5   • metoprolol succinate XL (TOPROL-XL) 50 MG 24 hr tablet Take 1 tablet by mouth Daily. 90 tablet 5   • PARoxetine (PAXIL) 20 MG tablet Take 1 tablet by mouth Daily. 90 tablet 5   • traZODone (DESYREL) 100 MG tablet TAKE ONE-HALF TO ONE TABLET BY MOUTH EVERY NIGHT AT BEDTIME FOR SLEEP 90 tablet 3   • albuterol sulfate  (90 Base) MCG/ACT inhaler Inhale 2 puffs Every 4 (Four) Hours As Needed for Wheezing or Shortness of Air. 6.7 g 0     No facility-administered medications prior to visit.       No opioid medication identified on active medication list. I have reviewed chart for other potential   "high risk medication/s and harmful drug interactions in the elderly.          Aspirin is on active medication list. Aspirin use is indicated based on review of current medical condition/s. Pros and cons of this therapy have been discussed today. Benefits of this medication outweigh potential harm.  Patient has been encouraged to continue taking this medication.  .      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  ACP discussion was held with the patient during this visit. Patient does not have an advance directive, information provided.          Objective    Vitals:    04/11/22 0808   BP: 120/76   Pulse: (!) 48   SpO2: 95%   Weight: 70.8 kg (156 lb)   Height: 175.3 cm (69\")   PainSc: 0-No pain     BMI Readings from Last 1 Encounters:   04/11/22 23.04 kg/m²   BMI is within normal parameters. No follow-up required.    Does the patient have evidence of cognitive impairment? No    Physical Exam  Vitals reviewed.   Constitutional:       General: He is not in acute distress.     Appearance: He is well-developed.   Cardiovascular:      Rate and Rhythm: Normal rate and regular rhythm.      Heart sounds: No murmur heard.  Pulmonary:      Effort: Pulmonary effort is normal. No respiratory distress.      Breath sounds: Normal breath sounds. No wheezing.   Abdominal:      Palpations: Abdomen is soft.      Tenderness: There is no abdominal tenderness.   Skin:     General: Skin is warm and dry.   Neurological:      Mental Status: He is alert and oriented to person, place, and time.               HEALTH RISK ASSESSMENT    Smoking Status:  Social History     Tobacco Use   Smoking Status Never Smoker   Smokeless Tobacco Never Used     Alcohol Consumption:  Social History     Substance " and Sexual Activity   Alcohol Use Not Currently     Fall Risk Screen:    YUADI Fall Risk Assessment was completed, and patient is at LOW risk for falls.Assessment completed on:4/11/2022    Depression Screening:  PHQ-2/PHQ-9 Depression Screening 4/11/2022   Retired PHQ-9 Total Score -   Retired Total Score -   Little Interest or Pleasure in Doing Things 0-->not at all   Feeling Down, Depressed or Hopeless 0-->not at all   PHQ-9: Brief Depression Severity Measure Score 0       Health Habits and Functional and Cognitive Screening:  Functional & Cognitive Status 4/11/2022   Do you have difficulty preparing food and eating? No   Do you have difficulty bathing yourself, getting dressed or grooming yourself? No   Do you have difficulty using the toilet? No   Do you have difficulty moving around from place to place? No   Do you have trouble with steps or getting out of a bed or a chair? No   Current Diet Well Balanced Diet   Dental Exam Up to date   Eye Exam Up to date   Exercise (times per week) 4 times per week   Current Exercises Include Walking   Current Exercise Activities Include -   Do you need help using the phone?  No   Are you deaf or do you have serious difficulty hearing?  No   Do you need help with transportation? No   Do you need help shopping? No   Do you need help preparing meals?  No   Do you need help with housework?  No   Do you need help with laundry? No   Do you need help taking your medications? No   Do you need help managing money? No   Do you ever drive or ride in a car without wearing a seat belt? No   Have you felt unusual stress, anger or loneliness in the last month? No   Who do you live with? Spouse   If you need help, do you have trouble finding someone available to you? No   Have you been bothered in the last four weeks by sexual problems? No   Do you have difficulty concentrating, remembering or making decisions? No       Age-appropriate Screening Schedule:  Refer to the list below for  future screening recommendations based on patient's age, sex and/or medical conditions. Orders for these recommended tests are listed in the plan section. The patient has been provided with a written plan.    Health Maintenance   Topic Date Due   • ZOSTER VACCINE (2 of 2) 06/09/2017   • INFLUENZA VACCINE  08/01/2022   • LIPID PANEL  12/14/2022   • TDAP/TD VACCINES (2 - Td or Tdap) 10/14/2026              Assessment/Plan   CMS Preventative Services Quick Reference  Risk Factors Identified During Encounter  Cardiovascular Disease  Immunizations Discussed/Encouraged (specific Immunizations; Shingrix  The above risks/problems have been discussed with the patient.  Follow up actions/plans if indicated are seen below in the Assessment/Plan Section.  Pertinent information has been shared with the patient in the After Visit Summary.    Diagnoses and all orders for this visit:    1. Medicare annual wellness visit, subsequent (Primary)    2. Stage 3a chronic kidney disease (HCC)  -     Basic metabolic panel; Future      Medicare wellness visit complete  Monitor chronic kidney disease - check BMP  If needed, patient has seen nephrology in the past  Resting heart rate 48 today, normally in 50s.   Patient will monitor at home and call with consistent low heart rate and metoprolol can be decreased if needed    Follow Up:   Return in about 4 months (around 8/11/2022) for Recheck.     An After Visit Summary and PPPS were made available to the patient.                   This document has been electronically signed by Saskia Camilo MD

## 2022-04-14 ENCOUNTER — TELEPHONE (OUTPATIENT)
Dept: FAMILY MEDICINE CLINIC | Facility: CLINIC | Age: 77
End: 2022-04-14

## 2022-04-14 NOTE — PROGRESS NOTES
Per Dr. Camilo, Mr. Abdul has been called with recent lab results & recommendations.  Continue current medications and follow-up as planned or sooner if any problems.     He does not have an appointment with PATRICIA Mills and has not seen him in a while but he will make an appointment

## 2022-04-14 NOTE — TELEPHONE ENCOUNTER
Per Dr. Camilo, Mr. Abdul has been called with recent lab results & recommendations.  Continue current medications and follow-up as planned or sooner if any problems.     He does not have an appointment with PATRICIA Mills and has not seen him in a while but he will make an appointment      ----- Message from Saskia Camilo MD sent at 4/13/2022 12:03 AM CDT -----  Kidney function slightly elevated from baseline.  Please find out if/when patient has next nephrology appointment.  Thanks, GASTON Camilo

## 2022-07-07 DIAGNOSIS — E78.00 PURE HYPERCHOLESTEROLEMIA: ICD-10-CM

## 2022-07-07 RX ORDER — ATORVASTATIN CALCIUM 40 MG/1
40 TABLET, FILM COATED ORAL
Qty: 30 TABLET | Refills: 3 | Status: SHIPPED | OUTPATIENT
Start: 2022-07-07 | End: 2022-08-11 | Stop reason: SDUPTHER

## 2022-08-01 ENCOUNTER — TRANSCRIBE ORDERS (OUTPATIENT)
Dept: LAB | Facility: HOSPITAL | Age: 77
End: 2022-08-01

## 2022-08-01 ENCOUNTER — LAB (OUTPATIENT)
Dept: LAB | Facility: HOSPITAL | Age: 77
End: 2022-08-01

## 2022-08-01 DIAGNOSIS — N18.31 CHRONIC KIDNEY DISEASE, STAGE 3A: ICD-10-CM

## 2022-08-01 DIAGNOSIS — E78.2 MIXED HYPERLIPIDEMIA: ICD-10-CM

## 2022-08-01 DIAGNOSIS — E78.2 MIXED HYPERLIPIDEMIA: Primary | ICD-10-CM

## 2022-08-01 LAB
25(OH)D3 SERPL-MCNC: 49.5 NG/ML (ref 30–100)
ALBUMIN SERPL-MCNC: 4.1 G/DL (ref 3.5–5.2)
ANION GAP SERPL CALCULATED.3IONS-SCNC: 9 MMOL/L (ref 5–15)
BASOPHILS # BLD MANUAL: 0.16 10*3/MM3 (ref 0–0.2)
BASOPHILS NFR BLD MANUAL: 2 % (ref 0–1.5)
BUN SERPL-MCNC: 10 MG/DL (ref 8–23)
BUN/CREAT SERPL: 8.4 (ref 7–25)
CALCIUM SPEC-SCNC: 9.3 MG/DL (ref 8.6–10.5)
CHLORIDE SERPL-SCNC: 104 MMOL/L (ref 98–107)
CO2 SERPL-SCNC: 27 MMOL/L (ref 22–29)
CREAT SERPL-MCNC: 1.19 MG/DL (ref 0.76–1.27)
CREAT UR-MCNC: 83.6 MG/DL
DEPRECATED RDW RBC AUTO: 42.8 FL (ref 37–54)
EGFRCR SERPLBLD CKD-EPI 2021: 63.7 ML/MIN/1.73
EOSINOPHIL # BLD MANUAL: 0.42 10*3/MM3 (ref 0–0.4)
EOSINOPHIL NFR BLD MANUAL: 5.1 % (ref 0.3–6.2)
ERYTHROCYTE [DISTWIDTH] IN BLOOD BY AUTOMATED COUNT: 13.7 % (ref 12.3–15.4)
GLUCOSE SERPL-MCNC: 86 MG/DL (ref 65–99)
HCT VFR BLD AUTO: 47.3 % (ref 37.5–51)
HGB BLD-MCNC: 16 G/DL (ref 13–17.7)
LYMPHOCYTES # BLD MANUAL: 1.83 10*3/MM3 (ref 0.7–3.1)
LYMPHOCYTES NFR BLD MANUAL: 7.1 % (ref 5–12)
MCH RBC QN AUTO: 29.4 PG (ref 26.6–33)
MCHC RBC AUTO-ENTMCNC: 33.8 G/DL (ref 31.5–35.7)
MCV RBC AUTO: 86.8 FL (ref 79–97)
MONOCYTES # BLD: 0.58 10*3/MM3 (ref 0.1–0.9)
NEUTROPHILS # BLD AUTO: 5.18 10*3/MM3 (ref 1.7–7)
NEUTROPHILS NFR BLD MANUAL: 63.3 % (ref 42.7–76)
PHOSPHATE SERPL-MCNC: 3.2 MG/DL (ref 2.5–4.5)
PLAT MORPH BLD: NORMAL
PLATELET # BLD AUTO: 207 10*3/MM3 (ref 140–450)
PMV BLD AUTO: 11.7 FL (ref 6–12)
POTASSIUM SERPL-SCNC: 4.6 MMOL/L (ref 3.5–5.2)
PROT ?TM UR-MCNC: 5.9 MG/DL
PROT/CREAT UR: 70.6 MG/G CREA (ref 0–200)
PTH-INTACT SERPL-MCNC: 46.2 PG/ML (ref 15–65)
RBC # BLD AUTO: 5.45 10*6/MM3 (ref 4.14–5.8)
RBC MORPH BLD: NORMAL
SMUDGE CELLS BLD QL SMEAR: ABNORMAL
SODIUM SERPL-SCNC: 140 MMOL/L (ref 136–145)
VARIANT LYMPHS NFR BLD MANUAL: 22.4 % (ref 19.6–45.3)
WBC NRBC COR # BLD: 8.18 10*3/MM3 (ref 3.4–10.8)

## 2022-08-01 PROCEDURE — 82306 VITAMIN D 25 HYDROXY: CPT

## 2022-08-01 PROCEDURE — 82570 ASSAY OF URINE CREATININE: CPT

## 2022-08-01 PROCEDURE — 83970 ASSAY OF PARATHORMONE: CPT

## 2022-08-01 PROCEDURE — 84156 ASSAY OF PROTEIN URINE: CPT

## 2022-08-01 PROCEDURE — 85027 COMPLETE CBC AUTOMATED: CPT

## 2022-08-01 PROCEDURE — 85007 BL SMEAR W/DIFF WBC COUNT: CPT

## 2022-08-01 PROCEDURE — 36415 COLL VENOUS BLD VENIPUNCTURE: CPT

## 2022-08-01 PROCEDURE — 80069 RENAL FUNCTION PANEL: CPT

## 2022-08-11 ENCOUNTER — OFFICE VISIT (OUTPATIENT)
Dept: FAMILY MEDICINE CLINIC | Facility: CLINIC | Age: 77
End: 2022-08-11

## 2022-08-11 VITALS
HEIGHT: 69 IN | DIASTOLIC BLOOD PRESSURE: 70 MMHG | HEART RATE: 56 BPM | SYSTOLIC BLOOD PRESSURE: 143 MMHG | WEIGHT: 153 LBS | OXYGEN SATURATION: 97 % | BODY MASS INDEX: 22.66 KG/M2

## 2022-08-11 DIAGNOSIS — I10 ESSENTIAL HYPERTENSION: Primary | ICD-10-CM

## 2022-08-11 DIAGNOSIS — N18.31 STAGE 3A CHRONIC KIDNEY DISEASE: ICD-10-CM

## 2022-08-11 DIAGNOSIS — E78.00 PURE HYPERCHOLESTEROLEMIA: ICD-10-CM

## 2022-08-11 DIAGNOSIS — F32.4 MAJOR DEPRESSIVE DISORDER WITH SINGLE EPISODE, IN PARTIAL REMISSION: ICD-10-CM

## 2022-08-11 PROCEDURE — 99214 OFFICE O/P EST MOD 30 MIN: CPT | Performed by: FAMILY MEDICINE

## 2022-08-11 RX ORDER — ATORVASTATIN CALCIUM 40 MG/1
40 TABLET, FILM COATED ORAL
Qty: 90 TABLET | Refills: 3 | Status: SHIPPED | OUTPATIENT
Start: 2022-08-11 | End: 2023-02-28 | Stop reason: SDUPTHER

## 2022-08-11 NOTE — PROGRESS NOTES
"Chief Complaint  Hypertension    Subjective    History of Present Illness {  Problem List  Visit  Diagnosis   Encounters  Notes  Medications  Labs  Result Review Imaging  Media :23}     Clifford [facility] [place] [facility]     Chief Complaint   Patient presents with   • Hypertension      Patient seen today for follow-up.  Has chronic medical problems including hypertension, hyperlipidemia, chronic kidney disease and depression.  Overall chronic medical problems have been well controlled.  Last kidney function was good.  Patient follows with nephrology for monitoring.  No new concerns today.    Current Outpatient Medications:   •  amLODIPine (NORVASC) 10 MG tablet, Take 1 tablet by mouth Daily., Disp: 90 tablet, Rfl: 5  •  aspirin 81 MG EC tablet, Take 81 mg by mouth Daily., Disp: , Rfl:   •  atorvastatin (LIPITOR) 40 MG tablet, Take 1 tablet by mouth every night at bedtime., Disp: 90 tablet, Rfl: 3  •  lisinopril (PRINIVIL,ZESTRIL) 40 MG tablet, Take 1 tablet by mouth Daily., Disp: 90 tablet, Rfl: 5  •  metoprolol succinate XL (TOPROL-XL) 50 MG 24 hr tablet, Take 1 tablet by mouth Daily., Disp: 90 tablet, Rfl: 5  •  PARoxetine (PAXIL) 20 MG tablet, Take 1 tablet by mouth Daily., Disp: 90 tablet, Rfl: 5  •  traZODone (DESYREL) 100 MG tablet, TAKE ONE-HALF TO ONE TABLET BY MOUTH EVERY NIGHT AT BEDTIME FOR SLEEP, Disp: 90 tablet, Rfl: 3     Objective       Vital Signs:   /70   Pulse 56   Ht 175.3 cm (69\")   Wt 69.4 kg (153 lb)   SpO2 97%   BMI 22.59 kg/m²     Physical Exam  Vitals reviewed.   Constitutional:       General: He is not in acute distress.     Appearance: He is well-developed.   Cardiovascular:      Rate and Rhythm: Normal rate and regular rhythm.      Heart sounds: No murmur heard.  Pulmonary:      Effort: Pulmonary effort is normal. No respiratory distress.      Breath sounds: Normal breath sounds. No wheezing. "   Abdominal:      Palpations: Abdomen is soft.      Tenderness: There is no abdominal tenderness.   Skin:     General: Skin is warm and dry.      Findings: No rash.   Neurological:      Mental Status: He is alert and oriented to person, place, and time.        Result Review :{ Labs  Result Review  Imaging  Med Tab  Media :23}   The following data was reviewed by: Saskia Camilo MD on 08/11/2022    Common labs    Common Labsle 12/14/21 12/14/21 4/11/22 8/1/22 8/1/22    0818 0818  0812 0812   Glucose 86  92 86    BUN 17  14 10    Creatinine 1.30 (A)  1.61 (A) 1.19    eGFR Non African Am 54 (A)       Sodium 140  140 140    Potassium 4.1  4.4 4.6    Chloride 102  104 104    Calcium 9.4  9.6 9.3    Albumin    4.10    WBC     8.18   Hemoglobin     16.0   Hematocrit     47.3   Platelets     207   Total Cholesterol  113      Triglycerides  73      HDL Cholesterol  66 (A)      LDL Cholesterol   32      (A) Abnormal value                    Assessment and Plan {CC Problem List  Visit Diagnosis  ROS  Review (Popup)  Health Maintenance  Quality  BestPractice  Medications  SmartSets  SnapShot Encounters  Media :23}   Diagnoses and all orders for this visit:    1. Essential hypertension (Primary)    2. Stage 3a chronic kidney disease (HCC)    3. Pure hypercholesterolemia  -     atorvastatin (LIPITOR) 40 MG tablet; Take 1 tablet by mouth every night at bedtime.  Dispense: 90 tablet; Refill: 3    4. Major depressive disorder with single episode, in partial remission (HCC)       Hypertension well controlled   Continue current medication  Kidney function stable at last check  Needed medication refills provided today  Patient will call with problems prior to next scheduled office visit      Follow Up {Instructions Charge Capture  Follow-up Communications :23}   Return in about 6 months (around 2/11/2023) for Recheck.  Patient was given instructions and counseling regarding his condition or for health maintenance  advice. Please see specific information pulled into the AVS if appropriate.            This document has been electronically signed by Saskia Camilo MD

## 2022-09-23 NOTE — TELEPHONE ENCOUNTER
Dupixent PA approved until 9/23/23.     Specialty Pharmacy - Refill Coordination  Specialty Pharmacy - Medication/Referral Authorization    Specialty Medication Orders Linked to Encounter      Flowsheet Row Most Recent Value   Medication #1 DUPIXENT SYRINGE 300 mg/2 mL Syrg (Order#[id number], Rx#[id number])            Refill Questions - Documented Responses      Flowsheet Row Most Recent Value   Patient Availability and HIPAA Verification    Does patient want to proceed with activity? Yes   HIPAA/medical authority confirmed? Yes   Relationship to patient of person spoken to? Self   Refill Screening Questions    Changes to allergies? No   Changes to medications? No   New conditions since last clinic visit? No   Unplanned office visit, urgent care, ED, or hospital admission in the last 4 weeks? No   How does patient/caregiver feel medication is working? Excellent   Financial problems or insurance changes? No   How many doses of your specialty medications were missed in the last 4 weeks? 0   Would patient like to speak to a pharmacist? No   When does the patient need to receive the medication? 09/27/22   Refill Delivery Questions    How will the patient receive the medication? Delivery [facility]lyn   When does the patient need to receive the medication? 09/27/22   Shipping Address Home   Address in [facility] confirmed and updated if neccessary? Yes   Expected Copay ($) 0   Is the patient able to afford the medication copay? Yes   Payment Method zero copay   Days supply of Refill 28   Supplies needed? No supplies needed   Refill activity completed? Yes   Refill activity plan Refill scheduled   Shipment/Pickup Date: 09/27/22            Current Outpatient Medications   Medication Sig    acetaminophen-codeine 300-30mg (TYLENOL #3) 300-30 mg Tab Take 1 tablet by mouth every 4 to 6 hours as needed.    amoxicillin (AMOXIL) 500 MG capsule TAKE 1 CAPSULE BY MOUTH THREE TIMES DAILY UNTIL GONE    blood sugar diagnostic Strp 1  Pt called for lab results. Phone is [phone].   each by Misc.(Non-Drug; Combo Route) route 2 (two) times a day.    blood sugar diagnostic Strp Use with glucometer daily as needed. Please send accu-chek dawit strips.    DUPIXENT SYRINGE 300 mg/2 mL Syrg Inject 300mg (1 syringe) into the skin every other week.    EPINEPHrine (EPIPEN) 0.3 mg/0.3 mL AtIn Inject 0.3 mLs (0.3 mg total) into the muscle once. for 1 dose    lancets (ACCU-CHEK SOFTCLIX LANCETS) Misc 1 each by Misc.(Non-Drug; Combo Route) route 2 (two) times a day.    olmesartan-amLODIPin-hcthiazid 40-10-25 mg Tab Take 1 tablet by mouth once daily.    tacrolimus (PROTOPIC) 0.1 % ointment Apply topically 2 (two) times daily. Non-steroid. Safe to use long-term    tiZANidine (ZANAFLEX) 4 MG tablet Take 4 mg by mouth nightly.    triamcinolone acetonide 0.1% (KENALOG) 0.1 % ointment AAA bid prn. Do not use on face, underarms or groin.   Last reviewed on 8/29/2022 12:02 PM by Sonja Rapp MD    Review of patient's allergies indicates:   Allergen Reactions    Fish containing products Itching, Nausea Only and Swelling    Last reviewed on  8/29/2022 12:02 PM by Sonja Rapp      Tasks added this encounter   No tasks added.   Tasks due within next 3 months   9/22/2022 - Refill Call (Auto Added)     Sofia Morris  [facility]  [address] [place] [id number]  Phone: [phone]  Fax: [phone]

## 2022-11-08 DIAGNOSIS — F32.4 MAJOR DEPRESSIVE DISORDER WITH SINGLE EPISODE, IN PARTIAL REMISSION: ICD-10-CM

## 2022-11-08 RX ORDER — TRAZODONE HYDROCHLORIDE 100 MG/1
TABLET ORAL
Qty: 90 TABLET | Refills: 3 | Status: SHIPPED | OUTPATIENT
Start: 2022-11-08 | End: 2023-02-28 | Stop reason: SDUPTHER

## 2022-11-08 NOTE — TELEPHONE ENCOUNTER
Incoming Refill Request      Medication requested (name and dose):    traZODone (DESYREL) 100 MG tablet         Pharm[facility] where request should be sent: [facility]     Additional details provided by patient:     Best call back number: [phone]    Does the patient have less than a 3 day supply:  [] Yes  [] No    Sara Sherman Rep  11/08/22, 14:10 CST

## 2022-12-01 ENCOUNTER — LAB (OUTPATIENT)
Dept: LAB | Facility: HOSPITAL | Age: 77
End: 2022-12-01

## 2022-12-01 ENCOUNTER — TRANSCRIBE ORDERS (OUTPATIENT)
Dept: LAB | Facility: HOSPITAL | Age: 77
End: 2022-12-01

## 2022-12-01 DIAGNOSIS — E78.2 MIXED HYPERLIPIDEMIA: Primary | ICD-10-CM

## 2022-12-01 DIAGNOSIS — E78.2 MIXED HYPERLIPIDEMIA: ICD-10-CM

## 2022-12-01 DIAGNOSIS — N18.31 CHRONIC KIDNEY DISEASE (CKD) STAGE G3A/A1, MODERATELY DECREASED GLOMERULAR FILTRATION RATE (GFR) BETWEEN 45-59 ML/MIN/1.73 SQUARE METER AND ALBUMINURIA CREATININE RATIO LESS THAN 30 MG/G (CMS/H*: ICD-10-CM

## 2022-12-01 LAB
ALBUMIN SERPL-MCNC: 4.6 G/DL (ref 3.5–5.2)
ANION GAP SERPL CALCULATED.3IONS-SCNC: 8 MMOL/L (ref 5–15)
BASOPHILS # BLD AUTO: 0.12 10*3/MM3 (ref 0–0.2)
BASOPHILS NFR BLD AUTO: 1.2 % (ref 0–1.5)
BUN SERPL-MCNC: 14 MG/DL (ref 8–23)
BUN/CREAT SERPL: 11.4 (ref 7–25)
CALCIUM SPEC-SCNC: 9.7 MG/DL (ref 8.6–10.5)
CHLORIDE SERPL-SCNC: 100 MMOL/L (ref 98–107)
CO2 SERPL-SCNC: 29 MMOL/L (ref 22–29)
CREAT SERPL-MCNC: 1.23 MG/DL (ref 0.76–1.27)
CREAT UR-MCNC: 81.6 MG/DL
DEPRECATED RDW RBC AUTO: 42.4 FL (ref 37–54)
EGFRCR SERPLBLD CKD-EPI 2021: 61.2 ML/MIN/1.73
EOSINOPHIL # BLD AUTO: 0.34 10*3/MM3 (ref 0–0.4)
EOSINOPHIL NFR BLD AUTO: 3.4 % (ref 0.3–6.2)
ERYTHROCYTE [DISTWIDTH] IN BLOOD BY AUTOMATED COUNT: 13.3 % (ref 12.3–15.4)
GLUCOSE SERPL-MCNC: 92 MG/DL (ref 65–99)
HCT VFR BLD AUTO: 48.1 % (ref 37.5–51)
HGB BLD-MCNC: 16 G/DL (ref 13–17.7)
IMM GRANULOCYTES # BLD AUTO: 0.03 10*3/MM3 (ref 0–0.05)
IMM GRANULOCYTES NFR BLD AUTO: 0.3 % (ref 0–0.5)
LYMPHOCYTES # BLD AUTO: 2.83 10*3/MM3 (ref 0.7–3.1)
LYMPHOCYTES NFR BLD AUTO: 28.7 % (ref 19.6–45.3)
MCH RBC QN AUTO: 28.8 PG (ref 26.6–33)
MCHC RBC AUTO-ENTMCNC: 33.3 G/DL (ref 31.5–35.7)
MCV RBC AUTO: 86.7 FL (ref 79–97)
MONOCYTES # BLD AUTO: 0.65 10*3/MM3 (ref 0.1–0.9)
MONOCYTES NFR BLD AUTO: 6.6 % (ref 5–12)
NEUTROPHILS NFR BLD AUTO: 5.9 10*3/MM3 (ref 1.7–7)
NEUTROPHILS NFR BLD AUTO: 59.8 % (ref 42.7–76)
NRBC BLD AUTO-RTO: 0 /100 WBC (ref 0–0.2)
PHOSPHATE SERPL-MCNC: 3.8 MG/DL (ref 2.5–4.5)
PLATELET # BLD AUTO: 254 10*3/MM3 (ref 140–450)
PMV BLD AUTO: 11.5 FL (ref 6–12)
POTASSIUM SERPL-SCNC: 4 MMOL/L (ref 3.5–5.2)
PROT ?TM UR-MCNC: 6.6 MG/DL
PROT/CREAT UR: 80.9 MG/G CREA (ref 0–200)
RBC # BLD AUTO: 5.55 10*6/MM3 (ref 4.14–5.8)
SODIUM SERPL-SCNC: 137 MMOL/L (ref 136–145)
URATE SERPL-MCNC: 5.6 MG/DL (ref 3.4–7)
WBC NRBC COR # BLD: 9.87 10*3/MM3 (ref 3.4–10.8)

## 2022-12-01 PROCEDURE — 84550 ASSAY OF BLOOD/URIC ACID: CPT

## 2022-12-01 PROCEDURE — 82570 ASSAY OF URINE CREATININE: CPT

## 2022-12-01 PROCEDURE — 84156 ASSAY OF PROTEIN URINE: CPT

## 2022-12-01 PROCEDURE — 85025 COMPLETE CBC W/AUTO DIFF WBC: CPT

## 2022-12-01 PROCEDURE — 80069 RENAL FUNCTION PANEL: CPT

## 2022-12-01 PROCEDURE — 36415 COLL VENOUS BLD VENIPUNCTURE: CPT

## 2023-02-15 ENCOUNTER — OFFICE VISIT (OUTPATIENT)
Dept: FAMILY MEDICINE CLINIC | Facility: CLINIC | Age: 78
End: 2023-02-15
Payer: MEDICARE

## 2023-02-15 VITALS
SYSTOLIC BLOOD PRESSURE: 120 MMHG | BODY MASS INDEX: 22.51 KG/M2 | DIASTOLIC BLOOD PRESSURE: 68 MMHG | HEIGHT: 69 IN | HEART RATE: 60 BPM | OXYGEN SATURATION: 96 % | WEIGHT: 152 LBS

## 2023-02-15 DIAGNOSIS — F32.4 MAJOR DEPRESSIVE DISORDER WITH SINGLE EPISODE, IN PARTIAL REMISSION: ICD-10-CM

## 2023-02-15 DIAGNOSIS — I10 ESSENTIAL HYPERTENSION: Primary | ICD-10-CM

## 2023-02-15 DIAGNOSIS — N18.31 STAGE 3A CHRONIC KIDNEY DISEASE: ICD-10-CM

## 2023-02-15 DIAGNOSIS — E78.00 PURE HYPERCHOLESTEROLEMIA: ICD-10-CM

## 2023-02-15 PROCEDURE — 99214 OFFICE O/P EST MOD 30 MIN: CPT | Performed by: FAMILY MEDICINE

## 2023-02-15 NOTE — PROGRESS NOTES
"Chief Complaint  Hypertension    Subjective    History of Present Illness {  Problem List  Visit  Diagnosis   Encounters  Notes  Medications  Labs  Result Review Imaging  Media :23}     Clifford Abdul presents to [facility] [place] for     Chief Complaint   Patient presents with   • Hypertension      Patient seen today for follow up.  Has chronic medical problems including hypertension, hyperlipidemia, chronic kidney disease and depression.  Adherent to medication regimen as below.  Blood pressure well controlled.  Depression controlled with Paxil and trazodone.         Current Outpatient Medications:   •  amLODIPine (NORVASC) 10 MG tablet, Take 1 tablet by mouth Daily., Disp: 90 tablet, Rfl: 5  •  aspirin 81 MG EC tablet, Take 81 mg by mouth Daily., Disp: , Rfl:   •  atorvastatin (LIPITOR) 40 MG tablet, Take 1 tablet by mouth every night at bedtime., Disp: 90 tablet, Rfl: 3  •  lisinopril (PRINIVIL,ZESTRIL) 40 MG tablet, Take 1 tablet by mouth Daily., Disp: 90 tablet, Rfl: 5  •  metoprolol succinate XL (TOPROL-XL) 50 MG 24 hr tablet, Take 1 tablet by mouth Daily., Disp: 90 tablet, Rfl: 5  •  PARoxetine (PAXIL) 20 MG tablet, Take 1 tablet by mouth Daily., Disp: 90 tablet, Rfl: 5  •  traZODone (DESYREL) 100 MG tablet, TAKE ONE-HALF TO ONE TABLET BY MOUTH EVERY NIGHT AT BEDTIME FOR SLEEP, Disp: 90 tablet, Rfl: 3     Objective       Vital Signs:   /68   Pulse 60   Ht 175.3 cm (69\")   Wt 68.9 kg (152 lb)   SpO2 96%   BMI 22.45 kg/m²     Physical Exam  Vitals reviewed.   Constitutional:       General: He is not in acute distress.     Appearance: He is well-developed.   Cardiovascular:      Rate and Rhythm: Normal rate and regular rhythm.      Heart sounds: No murmur heard.  Pulmonary:      Effort: Pulmonary effort is normal. No respiratory distress.      Breath sounds: Normal breath sounds. No wheezing.   Skin:     General: Skin is warm and dry.      " Findings: No rash.   Neurological:      Mental Status: He is alert and oriented to person, place, and time.        Result Review :{ Labs  Result Review  Imaging  Med Tab  Media :23}   The following data was reviewed by: Saskia Camilo MD on 02/15/2023    Common labs    Common Labs 12/1/22 12/1/22 12/1/22    0853 0853 0853   Glucose   92   BUN   14   Creatinine   1.23   Sodium   137   Potassium   4.0   Chloride   100   Calcium   9.7   Albumin   4.60   WBC 9.87     Hemoglobin 16.0     Hematocrit 48.1     Platelets 254     Uric Acid  5.6    (A) Abnormal value            Lab Results   Component Value Date    CHOL 113 12/14/2021    TRIG 73 12/14/2021    HDL 66 (H) 12/14/2021    LDL 32 12/14/2021            Assessment and Plan {CC Problem List  Visit Diagnosis  ROS  Review (Popup)  Health Maintenance  Quality  BestPractice  Medications  SmartSets  SnapShot Encounters  Media :23}   Diagnoses and all orders for this visit:    1. Essential hypertension (Primary)  -     metoprolol succinate XL (TOPROL-XL) 50 MG 24 hr tablet; Take 1 tablet by mouth Daily.  Dispense: 90 tablet; Refill: 3  -     lisinopril (PRINIVIL,ZESTRIL) 40 MG tablet; Take 1 tablet by mouth Daily.  Dispense: 90 tablet; Refill: 3  -     amLODIPine (NORVASC) 10 MG tablet; Take 1 tablet by mouth Daily.  Dispense: 90 tablet; Refill: 3    2. Stage 3a chronic kidney disease (HCC)    3. Major depressive disorder with single episode, in partial remission (HCC)  -     traZODone (DESYREL) 100 MG tablet; TAKE ONE-HALF TO ONE TABLET BY MOUTH EVERY NIGHT AT BEDTIME FOR SLEEP  Dispense: 90 tablet; Refill: 3  -     PARoxetine (PAXIL) 20 MG tablet; Take 1 tablet by mouth Daily.  Dispense: 90 tablet; Refill: 3    4. Pure hypercholesterolemia  -     atorvastatin (LIPITOR) 40 MG tablet; Take 1 tablet by mouth every night at bedtime.  Dispense: 90 tablet; Refill: 3      Patient seen today for follow up  Hypertension controlled, continue current  medication  Needed refills provided  Following with nephrology for chronic kidney disease, slightly improved on last labs  Depression controlled, continue current medication  Continue statin for hyperlipidemia       Follow Up {Instructions Charge Capture  Follow-up Communications :23}   Return for Medicare Wellness, Recheck.  Patient was given instructions and counseling regarding his condition or for health maintenance advice. Please see specific information pulled into the AVS if appropriate.            This document has been electronically signed by Saskia Camilo MD

## 2023-02-28 RX ORDER — METOPROLOL SUCCINATE 50 MG/1
50 TABLET, EXTENDED RELEASE ORAL DAILY
Qty: 90 TABLET | Refills: 3 | Status: SHIPPED | OUTPATIENT
Start: 2023-02-28

## 2023-02-28 RX ORDER — LISINOPRIL 40 MG/1
40 TABLET ORAL DAILY
Qty: 90 TABLET | Refills: 3 | Status: SHIPPED | OUTPATIENT
Start: 2023-02-28

## 2023-02-28 RX ORDER — PAROXETINE HYDROCHLORIDE 20 MG/1
20 TABLET, FILM COATED ORAL DAILY
Qty: 90 TABLET | Refills: 3 | Status: SHIPPED | OUTPATIENT
Start: 2023-02-28

## 2023-02-28 RX ORDER — TRAZODONE HYDROCHLORIDE 100 MG/1
TABLET ORAL
Qty: 90 TABLET | Refills: 3 | Status: SHIPPED | OUTPATIENT
Start: 2023-02-28

## 2023-02-28 RX ORDER — AMLODIPINE BESYLATE 10 MG/1
10 TABLET ORAL DAILY
Qty: 90 TABLET | Refills: 3 | Status: SHIPPED | OUTPATIENT
Start: 2023-02-28

## 2023-02-28 RX ORDER — ATORVASTATIN CALCIUM 40 MG/1
40 TABLET, FILM COATED ORAL
Qty: 90 TABLET | Refills: 3 | Status: SHIPPED | OUTPATIENT
Start: 2023-02-28

## 2023-08-15 ENCOUNTER — OFFICE VISIT (OUTPATIENT)
Dept: FAMILY MEDICINE CLINIC | Facility: CLINIC | Age: 78
End: 2023-08-15
Payer: MEDICARE

## 2023-08-15 ENCOUNTER — LAB (OUTPATIENT)
Dept: LAB | Facility: HOSPITAL | Age: 78
End: 2023-08-15
Payer: MEDICARE

## 2023-08-15 VITALS
DIASTOLIC BLOOD PRESSURE: 74 MMHG | WEIGHT: 152 LBS | HEART RATE: 50 BPM | OXYGEN SATURATION: 97 % | BODY MASS INDEX: 22.51 KG/M2 | SYSTOLIC BLOOD PRESSURE: 120 MMHG | HEIGHT: 69 IN

## 2023-08-15 DIAGNOSIS — I10 ESSENTIAL HYPERTENSION: ICD-10-CM

## 2023-08-15 DIAGNOSIS — N18.31 STAGE 3A CHRONIC KIDNEY DISEASE: ICD-10-CM

## 2023-08-15 DIAGNOSIS — E78.00 PURE HYPERCHOLESTEROLEMIA: ICD-10-CM

## 2023-08-15 DIAGNOSIS — Z00.00 MEDICARE ANNUAL WELLNESS VISIT, SUBSEQUENT: Primary | ICD-10-CM

## 2023-08-15 DIAGNOSIS — F32.4 MAJOR DEPRESSIVE DISORDER WITH SINGLE EPISODE, IN PARTIAL REMISSION: ICD-10-CM

## 2023-08-15 LAB
ALBUMIN SERPL-MCNC: 4.6 G/DL (ref 3.5–5.2)
ALBUMIN/GLOB SERPL: 1.5 G/DL
ALP SERPL-CCNC: 180 U/L (ref 39–117)
ALT SERPL W P-5'-P-CCNC: 29 U/L (ref 1–41)
ANION GAP SERPL CALCULATED.3IONS-SCNC: 10.9 MMOL/L (ref 5–15)
AST SERPL-CCNC: 35 U/L (ref 1–40)
BASOPHILS # BLD AUTO: 0.08 10*3/MM3 (ref 0–0.2)
BASOPHILS NFR BLD AUTO: 1 % (ref 0–1.5)
BILIRUB SERPL-MCNC: 0.8 MG/DL (ref 0–1.2)
BUN SERPL-MCNC: 13 MG/DL (ref 8–23)
BUN/CREAT SERPL: 9.8 (ref 7–25)
CALCIUM SPEC-SCNC: 9.9 MG/DL (ref 8.6–10.5)
CHLORIDE SERPL-SCNC: 104 MMOL/L (ref 98–107)
CHOLEST SERPL-MCNC: 140 MG/DL (ref 0–200)
CO2 SERPL-SCNC: 25.1 MMOL/L (ref 22–29)
CREAT SERPL-MCNC: 1.33 MG/DL (ref 0.76–1.27)
DEPRECATED RDW RBC AUTO: 42.1 FL (ref 37–54)
EGFRCR SERPLBLD CKD-EPI 2021: 55.4 ML/MIN/1.73
EOSINOPHIL # BLD AUTO: 0.3 10*3/MM3 (ref 0–0.4)
EOSINOPHIL NFR BLD AUTO: 3.6 % (ref 0.3–6.2)
ERYTHROCYTE [DISTWIDTH] IN BLOOD BY AUTOMATED COUNT: 13.4 % (ref 12.3–15.4)
GLOBULIN UR ELPH-MCNC: 3 GM/DL
GLUCOSE SERPL-MCNC: 90 MG/DL (ref 65–99)
HCT VFR BLD AUTO: 50.5 % (ref 37.5–51)
HDLC SERPL-MCNC: 74 MG/DL (ref 40–60)
HGB BLD-MCNC: 17.2 G/DL (ref 13–17.7)
IMM GRANULOCYTES # BLD AUTO: 0.04 10*3/MM3 (ref 0–0.05)
IMM GRANULOCYTES NFR BLD AUTO: 0.5 % (ref 0–0.5)
LDLC SERPL CALC-MCNC: 49 MG/DL (ref 0–100)
LDLC/HDLC SERPL: 0.64 {RATIO}
LYMPHOCYTES # BLD AUTO: 2.03 10*3/MM3 (ref 0.7–3.1)
LYMPHOCYTES NFR BLD AUTO: 24.2 % (ref 19.6–45.3)
MCH RBC QN AUTO: 29.8 PG (ref 26.6–33)
MCHC RBC AUTO-ENTMCNC: 34.1 G/DL (ref 31.5–35.7)
MCV RBC AUTO: 87.5 FL (ref 79–97)
MONOCYTES # BLD AUTO: 0.51 10*3/MM3 (ref 0.1–0.9)
MONOCYTES NFR BLD AUTO: 6.1 % (ref 5–12)
NEUTROPHILS NFR BLD AUTO: 5.42 10*3/MM3 (ref 1.7–7)
NEUTROPHILS NFR BLD AUTO: 64.6 % (ref 42.7–76)
NRBC BLD AUTO-RTO: 0 /100 WBC (ref 0–0.2)
PLATELET # BLD AUTO: 217 10*3/MM3 (ref 140–450)
PMV BLD AUTO: 11.8 FL (ref 6–12)
POTASSIUM SERPL-SCNC: 4.4 MMOL/L (ref 3.5–5.2)
PROT SERPL-MCNC: 7.6 G/DL (ref 6–8.5)
RBC # BLD AUTO: 5.77 10*6/MM3 (ref 4.14–5.8)
SODIUM SERPL-SCNC: 140 MMOL/L (ref 136–145)
TRIGL SERPL-MCNC: 92 MG/DL (ref 0–150)
VLDLC SERPL-MCNC: 17 MG/DL (ref 5–40)
WBC NRBC COR # BLD: 8.38 10*3/MM3 (ref 3.4–10.8)

## 2023-08-15 PROCEDURE — 36415 COLL VENOUS BLD VENIPUNCTURE: CPT

## 2023-08-15 PROCEDURE — 80053 COMPREHEN METABOLIC PANEL: CPT

## 2023-08-15 PROCEDURE — 80061 LIPID PANEL: CPT

## 2023-08-15 PROCEDURE — 85025 COMPLETE CBC W/AUTO DIFF WBC: CPT

## 2023-08-15 NOTE — PROGRESS NOTES
The ABCs of the Annual Wellness Visit  Subsequent Medicare Wellness Visit    Subjective      Clifford Abdul is a 78 y.o. male who presents for a Subsequent Medicare Wellness Visit.    The following portions of the patient's history were reviewed and   updated as appropriate: allergies, current medications, past family history, past medical history, past social history, past surgical history, and problem list.    Compared to one year ago, the patient feels his physical   health is the same.    Compared to one year ago, the patient feels his mental   health is the same.    Recent Hospitalizations:  He was not admitted to the hospital during the last year.       Current Medical Providers:  Patient Care Team:  Saskia Camilo MD as PCP - General (Family Medicine)  Gene Javier APRN as Nurse Practitioner (Nurse Practitioner)  Jea nClaude Self MD as Consulting Physician (Ophthalmology)    Outpatient Medications Prior to Visit   Medication Sig Dispense Refill    amLODIPine (NORVASC) 10 MG tablet Take 1 tablet by mouth Daily. 90 tablet 3    aspirin 81 MG EC tablet Take 1 tablet by mouth Daily.      atorvastatin (LIPITOR) 40 MG tablet Take 1 tablet by mouth every night at bedtime. 90 tablet 3    lisinopril (PRINIVIL,ZESTRIL) 40 MG tablet Take 1 tablet by mouth Daily. 90 tablet 3    metoprolol succinate XL (TOPROL-XL) 50 MG 24 hr tablet Take 1 tablet by mouth Daily. 90 tablet 3    PARoxetine (PAXIL) 20 MG tablet Take 1 tablet by mouth Daily. 90 tablet 3    traZODone (DESYREL) 100 MG tablet TAKE ONE-HALF TO ONE TABLET BY MOUTH EVERY NIGHT AT BEDTIME FOR SLEEP 90 tablet 3     No facility-administered medications prior to visit.       No opioid medication identified on active medication list. I have reviewed chart for other potential  high risk medication/s and harmful drug interactions in the elderly.        Aspirin is on active medication list. Aspirin use is indicated based on review of current medical  "condition/s. Pros and cons of this therapy have been discussed today. Benefits of this medication outweigh potential harm.  Patient has been encouraged to continue taking this medication.  .      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  ACP discussion was held with the patient during this visit. Patient does not have an advance directive, declines further assistance.  Has information at home.     Objective    Vitals:    08/15/23 0801   BP: 120/74   Pulse: 50   SpO2: 97%   Weight: 68.9 kg (152 lb)   Height: 175.3 cm (69\")   PainSc: 0-No pain     Estimated body mass index is 22.45 kg/mý as calculated from the following:    Height as of this encounter: 175.3 cm (69\").    Weight as of this encounter: 68.9 kg (152 lb).    BMI is within normal parameters. No other follow-up for BMI required.      Does the patient have evidence of cognitive impairment?   No            HEALTH RISK ASSESSMENT    Smoking Status:  Social History     Tobacco Use   Smoking Status Never   Smokeless Tobacco Never     Alcohol Consumption:  Social History     Substance and Sexual Activity   Alcohol Use Not Currently     Fall Risk Screen:    STEADI Fall Risk Assessment was completed, and patient is at LOW risk for falls.Assessment completed on:8/15/2023    Depression Screenin/15/2023     8:04 AM   PHQ-2/PHQ-9 Depression Screening   Little Interest or Pleasure in Doing Things 0-->not at all   Feeling Down, Depressed or Hopeless 0-->not at all   PHQ-9: Brief Depression Severity Measure Score 0       Health Habits and Functional and Cognitive Screenin/15/2023     8:00 AM   Functional & Cognitive Status   Do you have difficulty preparing food and eating? No   Do you have " difficulty bathing yourself, getting dressed or grooming yourself? No   Do you have difficulty using the toilet? No   Do you have difficulty moving around from place to place? No   Do you have trouble with steps or getting out of a bed or a chair? No   Current Diet Well Balanced Diet   Dental Exam Up to date   Eye Exam Up to date   Exercise (times per week) 5 times per week   Current Exercises Include Light Weights   Do you need help using the phone?  No   Are you deaf or do you have serious difficulty hearing?  No   Do you need help to go to places out of walking distance? No   Do you need help shopping? No   Do you need help preparing meals?  No   Do you need help with housework?  No   Do you need help with laundry? No   Do you need help taking your medications? No   Do you need help managing money? No   Do you ever drive or ride in a car without wearing a seat belt? No   Have you felt unusual stress, anger or loneliness in the last month? No   Who do you live with? Spouse   If you need help, do you have trouble finding someone available to you? No   Have you been bothered in the last four weeks by sexual problems? No   Do you have difficulty concentrating, remembering or making decisions? No       Age-appropriate Screening Schedule:  Refer to the list below for future screening recommendations based on patient's age, sex and/or medical conditions. Orders for these recommended tests are listed in the plan section. The patient has been provided with a written plan.    Health Maintenance   Topic Date Due    ZOSTER VACCINE (2 of 2) 06/09/2017    COVID-19 Vaccine (5 - Pfizer series) 06/30/2022    LIPID PANEL  12/14/2022    ANNUAL WELLNESS VISIT  04/11/2023    INFLUENZA VACCINE  10/01/2023    TDAP/TD VACCINES (2 - Td or Tdap) 10/14/2026    COLORECTAL CANCER SCREENING  10/29/2028    HEPATITIS C SCREENING  Completed    Pneumococcal Vaccine 65+  Completed    Hepatitis B  Aged Out                  CMS Preventative Services  Quick Reference  Risk Factors Identified During Encounter:    None Identified    The above risks/problems have been discussed with the patient.  Pertinent information has been shared with the patient in the After Visit Summary.    Diagnoses and all orders for this visit:    1. Medicare annual wellness visit, subsequent (Primary)    2. Essential hypertension  -     CBC & Differential; Future  -     Comprehensive Metabolic Panel; Future    3. Stage 3a chronic kidney disease    4. Pure hypercholesterolemia  -     Lipid Panel; Future    5. Major depressive disorder with single episode, in partial remission      Medicare wellness visit completed today  Discussed recommended screenings and immunizations  Hypertension controlled, continue current medication  Discussed if heart rate was less than 50, he should decrease metoprolol and call  Check lipid panel, CBC and CMP  Stage IIIa chronic kidney disease, continue monitoring      Follow Up:   Return in about 6 months (around 2/15/2024) for Recheck.   Next Medicare Wellness visit to be scheduled in 1 year.      An After Visit Summary and PPPS were made available to the patient.          This document has been electronically signed by Saskia Camilo MD

## 2023-08-18 ENCOUNTER — TELEPHONE (OUTPATIENT)
Dept: FAMILY MEDICINE CLINIC | Facility: CLINIC | Age: 78
End: 2023-08-18
Payer: MEDICARE

## 2023-08-21 ENCOUNTER — TELEPHONE (OUTPATIENT)
Dept: FAMILY MEDICINE CLINIC | Facility: CLINIC | Age: 78
End: 2023-08-21
Payer: MEDICARE

## 2023-08-21 NOTE — TELEPHONE ENCOUNTER
-Per  Dr. Camilo, Mr. Abdul has been called with recent lab results & recommendations.  Continue current medications and follow-up as planned or sooner if any problems.     ---- Message from Saskia Camilo MD sent at 8/19/2023  7:25 AM CDT -----  Labs stable.  Kidney function is still decreased just a little - no change from last 2 years.  Thanks, GASTON Camilo

## 2023-08-21 NOTE — PROGRESS NOTES
Per  Dr. Camilo, Mr. Abdul has been called with recent lab results & recommendations.  Continue current medications and follow-up as planned or sooner if any problems.